# Patient Record
Sex: MALE | Race: WHITE | NOT HISPANIC OR LATINO | Employment: FULL TIME | URBAN - METROPOLITAN AREA
[De-identification: names, ages, dates, MRNs, and addresses within clinical notes are randomized per-mention and may not be internally consistent; named-entity substitution may affect disease eponyms.]

---

## 2018-01-29 ENCOUNTER — OFFICE VISIT - HEALTHEAST (OUTPATIENT)
Dept: FAMILY MEDICINE | Facility: CLINIC | Age: 38
End: 2018-01-29

## 2018-01-29 DIAGNOSIS — E66.811 OBESITY (BMI 30.0-34.9): ICD-10-CM

## 2018-01-29 DIAGNOSIS — E89.0 POSTOPERATIVE HYPOTHYROIDISM: ICD-10-CM

## 2018-01-29 DIAGNOSIS — E89.0 HISTORY OF TOTAL THYROIDECTOMY: ICD-10-CM

## 2018-01-29 DIAGNOSIS — Z13.220 SCREENING FOR LIPID DISORDERS: ICD-10-CM

## 2018-01-29 DIAGNOSIS — F17.200 SMOKER: ICD-10-CM

## 2018-01-29 DIAGNOSIS — I10 HYPERTENSION: ICD-10-CM

## 2018-01-29 LAB
ANION GAP SERPL CALCULATED.3IONS-SCNC: 12 MMOL/L (ref 5–18)
BUN SERPL-MCNC: 11 MG/DL (ref 8–22)
CALCIUM SERPL-MCNC: 10.2 MG/DL (ref 8.5–10.5)
CHLORIDE BLD-SCNC: 103 MMOL/L (ref 98–107)
CHOLEST SERPL-MCNC: 373 MG/DL
CO2 SERPL-SCNC: 23 MMOL/L (ref 22–31)
CREAT SERPL-MCNC: 0.89 MG/DL (ref 0.7–1.3)
FASTING STATUS PATIENT QL REPORTED: NO
GFR SERPL CREATININE-BSD FRML MDRD: >60 ML/MIN/1.73M2
GLUCOSE BLD-MCNC: 77 MG/DL (ref 70–125)
HDLC SERPL-MCNC: 56 MG/DL
LDLC SERPL CALC-MCNC: 252 MG/DL
LDLC SERPL CALC-MCNC: ABNORMAL MG/DL
POTASSIUM BLD-SCNC: 4.1 MMOL/L (ref 3.5–5)
SODIUM SERPL-SCNC: 138 MMOL/L (ref 136–145)
TRIGL SERPL-MCNC: 489 MG/DL
TSH SERPL DL<=0.005 MIU/L-ACNC: 98.82 UIU/ML (ref 0.3–5)

## 2018-01-29 ASSESSMENT — MIFFLIN-ST. JEOR: SCORE: 1671.48

## 2018-01-30 ENCOUNTER — COMMUNICATION - HEALTHEAST (OUTPATIENT)
Dept: FAMILY MEDICINE | Facility: CLINIC | Age: 38
End: 2018-01-30

## 2018-02-01 ENCOUNTER — COMMUNICATION - HEALTHEAST (OUTPATIENT)
Dept: FAMILY MEDICINE | Facility: CLINIC | Age: 38
End: 2018-02-01

## 2018-02-09 ENCOUNTER — OFFICE VISIT - HEALTHEAST (OUTPATIENT)
Dept: FAMILY MEDICINE | Facility: CLINIC | Age: 38
End: 2018-02-09

## 2018-02-09 DIAGNOSIS — E78.2 MIXED HYPERLIPIDEMIA: ICD-10-CM

## 2018-02-09 DIAGNOSIS — I10 ESSENTIAL HYPERTENSION: ICD-10-CM

## 2018-02-09 DIAGNOSIS — E89.0 POSTOPERATIVE HYPOTHYROIDISM: ICD-10-CM

## 2018-02-09 DIAGNOSIS — Z31.41 FERTILITY TESTING: ICD-10-CM

## 2018-02-09 DIAGNOSIS — Z76.89 ESTABLISHING CARE WITH NEW DOCTOR, ENCOUNTER FOR: ICD-10-CM

## 2018-02-09 ASSESSMENT — MIFFLIN-ST. JEOR: SCORE: 1663.66

## 2018-06-11 ENCOUNTER — COMMUNICATION - HEALTHEAST (OUTPATIENT)
Dept: FAMILY MEDICINE | Facility: CLINIC | Age: 38
End: 2018-06-11

## 2018-06-11 DIAGNOSIS — I10 HYPERTENSION: ICD-10-CM

## 2018-06-11 DIAGNOSIS — E89.0 POSTOPERATIVE HYPOTHYROIDISM: ICD-10-CM

## 2018-06-12 ENCOUNTER — COMMUNICATION - HEALTHEAST (OUTPATIENT)
Dept: FAMILY MEDICINE | Facility: CLINIC | Age: 38
End: 2018-06-12

## 2018-06-12 DIAGNOSIS — E89.0 POSTOPERATIVE HYPOTHYROIDISM: ICD-10-CM

## 2018-06-12 DIAGNOSIS — I10 HYPERTENSION: ICD-10-CM

## 2018-10-23 ENCOUNTER — COMMUNICATION - HEALTHEAST (OUTPATIENT)
Dept: FAMILY MEDICINE | Facility: CLINIC | Age: 38
End: 2018-10-23

## 2018-10-23 DIAGNOSIS — E89.0 POSTOPERATIVE HYPOTHYROIDISM: ICD-10-CM

## 2018-10-23 DIAGNOSIS — I10 HYPERTENSION: ICD-10-CM

## 2019-06-20 ENCOUNTER — COMMUNICATION - HEALTHEAST (OUTPATIENT)
Dept: FAMILY MEDICINE | Facility: CLINIC | Age: 39
End: 2019-06-20

## 2019-06-20 DIAGNOSIS — E89.0 POSTOPERATIVE HYPOTHYROIDISM: ICD-10-CM

## 2019-06-20 DIAGNOSIS — I10 HYPERTENSION: ICD-10-CM

## 2019-06-25 ENCOUNTER — OFFICE VISIT - HEALTHEAST (OUTPATIENT)
Dept: FAMILY MEDICINE | Facility: CLINIC | Age: 39
End: 2019-06-25

## 2019-06-25 DIAGNOSIS — E89.0 POSTOPERATIVE HYPOTHYROIDISM: ICD-10-CM

## 2019-06-25 DIAGNOSIS — Z79.899 MEDICATION MANAGEMENT: ICD-10-CM

## 2019-06-25 DIAGNOSIS — I10 ESSENTIAL HYPERTENSION: ICD-10-CM

## 2019-06-25 DIAGNOSIS — E66.09 CLASS 1 OBESITY DUE TO EXCESS CALORIES WITH SERIOUS COMORBIDITY AND BODY MASS INDEX (BMI) OF 30.0 TO 30.9 IN ADULT: ICD-10-CM

## 2019-06-25 DIAGNOSIS — Z71.6 ENCOUNTER FOR SMOKING CESSATION COUNSELING: ICD-10-CM

## 2019-06-25 DIAGNOSIS — E78.2 MIXED HYPERLIPIDEMIA: ICD-10-CM

## 2019-06-25 DIAGNOSIS — E66.811 CLASS 1 OBESITY DUE TO EXCESS CALORIES WITH SERIOUS COMORBIDITY AND BODY MASS INDEX (BMI) OF 30.0 TO 30.9 IN ADULT: ICD-10-CM

## 2019-06-25 LAB
ALBUMIN SERPL-MCNC: 3.5 G/DL (ref 3.5–5)
ALP SERPL-CCNC: 72 U/L (ref 45–120)
ALT SERPL W P-5'-P-CCNC: 46 U/L (ref 0–45)
ANION GAP SERPL CALCULATED.3IONS-SCNC: 11 MMOL/L (ref 5–18)
AST SERPL W P-5'-P-CCNC: 24 U/L (ref 0–40)
BILIRUB SERPL-MCNC: 0.7 MG/DL (ref 0–1)
BUN SERPL-MCNC: 14 MG/DL (ref 8–22)
CALCIUM SERPL-MCNC: 10.4 MG/DL (ref 8.5–10.5)
CHLORIDE BLD-SCNC: 101 MMOL/L (ref 98–107)
CHOLEST SERPL-MCNC: 151 MG/DL
CO2 SERPL-SCNC: 25 MMOL/L (ref 22–31)
CREAT SERPL-MCNC: 0.93 MG/DL (ref 0.7–1.3)
FASTING STATUS PATIENT QL REPORTED: YES
GFR SERPL CREATININE-BSD FRML MDRD: >60 ML/MIN/1.73M2
GLUCOSE BLD-MCNC: 95 MG/DL (ref 70–125)
HDLC SERPL-MCNC: 37 MG/DL
LDLC SERPL CALC-MCNC: 82 MG/DL
POTASSIUM BLD-SCNC: 3.9 MMOL/L (ref 3.5–5)
PROT SERPL-MCNC: 7.6 G/DL (ref 6–8)
SODIUM SERPL-SCNC: 137 MMOL/L (ref 136–145)
TRIGL SERPL-MCNC: 158 MG/DL
TSH SERPL DL<=0.005 MIU/L-ACNC: 1.22 UIU/ML (ref 0.3–5)

## 2019-06-25 ASSESSMENT — MIFFLIN-ST. JEOR: SCORE: 1668.99

## 2019-06-30 ENCOUNTER — COMMUNICATION - HEALTHEAST (OUTPATIENT)
Dept: FAMILY MEDICINE | Facility: CLINIC | Age: 39
End: 2019-06-30

## 2019-08-10 ENCOUNTER — COMMUNICATION - HEALTHEAST (OUTPATIENT)
Dept: FAMILY MEDICINE | Facility: CLINIC | Age: 39
End: 2019-08-10

## 2019-08-10 DIAGNOSIS — E89.0 POSTOPERATIVE HYPOTHYROIDISM: ICD-10-CM

## 2019-08-10 DIAGNOSIS — I10 HYPERTENSION: ICD-10-CM

## 2019-08-13 ENCOUNTER — COMMUNICATION - HEALTHEAST (OUTPATIENT)
Dept: FAMILY MEDICINE | Facility: CLINIC | Age: 39
End: 2019-08-13

## 2019-08-13 DIAGNOSIS — I10 ESSENTIAL HYPERTENSION: ICD-10-CM

## 2019-08-30 ENCOUNTER — OFFICE VISIT - HEALTHEAST (OUTPATIENT)
Dept: FAMILY MEDICINE | Facility: CLINIC | Age: 39
End: 2019-08-30

## 2019-08-30 DIAGNOSIS — L30.8 OTHER ECZEMA: ICD-10-CM

## 2019-08-30 ASSESSMENT — MIFFLIN-ST. JEOR: SCORE: 1703.92

## 2019-09-08 ENCOUNTER — OFFICE VISIT - HEALTHEAST (OUTPATIENT)
Dept: FAMILY MEDICINE | Facility: CLINIC | Age: 39
End: 2019-09-08

## 2019-09-08 DIAGNOSIS — R21 RASH AND NONSPECIFIC SKIN ERUPTION: ICD-10-CM

## 2019-09-30 ENCOUNTER — COMMUNICATION - HEALTHEAST (OUTPATIENT)
Dept: FAMILY MEDICINE | Facility: CLINIC | Age: 39
End: 2019-09-30

## 2019-09-30 DIAGNOSIS — E89.0 POSTOPERATIVE HYPOTHYROIDISM: ICD-10-CM

## 2020-04-10 ENCOUNTER — COMMUNICATION - HEALTHEAST (OUTPATIENT)
Dept: FAMILY MEDICINE | Facility: CLINIC | Age: 40
End: 2020-04-10

## 2020-04-10 DIAGNOSIS — I10 ESSENTIAL HYPERTENSION: ICD-10-CM

## 2020-07-29 ENCOUNTER — COMMUNICATION - HEALTHEAST (OUTPATIENT)
Dept: FAMILY MEDICINE | Facility: CLINIC | Age: 40
End: 2020-07-29

## 2020-07-29 DIAGNOSIS — I10 ESSENTIAL HYPERTENSION: ICD-10-CM

## 2020-08-18 ENCOUNTER — COMMUNICATION - HEALTHEAST (OUTPATIENT)
Dept: FAMILY MEDICINE | Facility: CLINIC | Age: 40
End: 2020-08-18

## 2020-08-18 DIAGNOSIS — E89.0 POSTOPERATIVE HYPOTHYROIDISM: ICD-10-CM

## 2020-08-20 ENCOUNTER — COMMUNICATION - HEALTHEAST (OUTPATIENT)
Dept: FAMILY MEDICINE | Facility: CLINIC | Age: 40
End: 2020-08-20

## 2020-08-24 ENCOUNTER — OFFICE VISIT - HEALTHEAST (OUTPATIENT)
Dept: FAMILY MEDICINE | Facility: CLINIC | Age: 40
End: 2020-08-24

## 2020-08-24 DIAGNOSIS — Z11.4 SCREENING FOR HIV (HUMAN IMMUNODEFICIENCY VIRUS): ICD-10-CM

## 2020-08-24 DIAGNOSIS — E78.2 MIXED HYPERLIPIDEMIA: ICD-10-CM

## 2020-08-24 DIAGNOSIS — E89.0 POSTOPERATIVE HYPOTHYROIDISM: ICD-10-CM

## 2020-08-24 DIAGNOSIS — I10 ESSENTIAL HYPERTENSION: ICD-10-CM

## 2020-08-24 DIAGNOSIS — Z72.0 TOBACCO ABUSE: ICD-10-CM

## 2020-08-24 LAB
ALBUMIN SERPL-MCNC: 4 G/DL (ref 3.5–5)
ALP SERPL-CCNC: 68 U/L (ref 45–120)
ALT SERPL W P-5'-P-CCNC: 42 U/L (ref 0–45)
ANION GAP SERPL CALCULATED.3IONS-SCNC: 8 MMOL/L (ref 5–18)
AST SERPL W P-5'-P-CCNC: 20 U/L (ref 0–40)
BILIRUB SERPL-MCNC: 0.3 MG/DL (ref 0–1)
BUN SERPL-MCNC: 11 MG/DL (ref 8–22)
CALCIUM SERPL-MCNC: 9.6 MG/DL (ref 8.5–10.5)
CHLORIDE BLD-SCNC: 105 MMOL/L (ref 98–107)
CHOLEST SERPL-MCNC: 168 MG/DL
CO2 SERPL-SCNC: 25 MMOL/L (ref 22–31)
CREAT SERPL-MCNC: 0.82 MG/DL (ref 0.7–1.3)
FASTING STATUS PATIENT QL REPORTED: YES
GFR SERPL CREATININE-BSD FRML MDRD: >60 ML/MIN/1.73M2
GLUCOSE BLD-MCNC: 100 MG/DL (ref 70–125)
HDLC SERPL-MCNC: 40 MG/DL
HIV 1+2 AB+HIV1 P24 AG SERPL QL IA: NEGATIVE
LDLC SERPL CALC-MCNC: 97 MG/DL
POTASSIUM BLD-SCNC: 3.9 MMOL/L (ref 3.5–5)
PROT SERPL-MCNC: 7 G/DL (ref 6–8)
SODIUM SERPL-SCNC: 138 MMOL/L (ref 136–145)
TRIGL SERPL-MCNC: 154 MG/DL
TSH SERPL DL<=0.005 MIU/L-ACNC: 4.99 UIU/ML (ref 0.3–5)

## 2020-08-24 RX ORDER — BUPROPION HYDROCHLORIDE 150 MG/1
150 TABLET ORAL DAILY
Qty: 30 TABLET | Refills: 3 | Status: SHIPPED | OUTPATIENT
Start: 2020-08-24 | End: 2021-12-03

## 2020-08-24 ASSESSMENT — MIFFLIN-ST. JEOR: SCORE: 1712.99

## 2020-10-03 ENCOUNTER — COMMUNICATION - HEALTHEAST (OUTPATIENT)
Dept: FAMILY MEDICINE | Facility: CLINIC | Age: 40
End: 2020-10-03

## 2020-10-03 DIAGNOSIS — E78.2 MIXED HYPERLIPIDEMIA: ICD-10-CM

## 2020-10-06 RX ORDER — ATORVASTATIN CALCIUM 40 MG/1
TABLET, FILM COATED ORAL
Qty: 90 TABLET | Refills: 3 | Status: SHIPPED | OUTPATIENT
Start: 2020-10-06 | End: 2021-12-02

## 2020-11-22 ENCOUNTER — COMMUNICATION - HEALTHEAST (OUTPATIENT)
Dept: FAMILY MEDICINE | Facility: CLINIC | Age: 40
End: 2020-11-22

## 2020-11-22 DIAGNOSIS — I10 ESSENTIAL HYPERTENSION: ICD-10-CM

## 2020-11-23 RX ORDER — LOSARTAN POTASSIUM 50 MG/1
TABLET ORAL
Qty: 90 TABLET | Refills: 2 | Status: SHIPPED | OUTPATIENT
Start: 2020-11-23 | End: 2021-12-03

## 2021-01-25 ENCOUNTER — COMMUNICATION - HEALTHEAST (OUTPATIENT)
Dept: FAMILY MEDICINE | Facility: CLINIC | Age: 41
End: 2021-01-25

## 2021-01-25 DIAGNOSIS — E89.0 POSTOPERATIVE HYPOTHYROIDISM: ICD-10-CM

## 2021-01-25 RX ORDER — LEVOTHYROXINE SODIUM 200 UG/1
TABLET ORAL
Qty: 90 TABLET | Refills: 1 | Status: SHIPPED | OUTPATIENT
Start: 2021-01-25 | End: 2021-10-27

## 2021-05-26 ENCOUNTER — RECORDS - HEALTHEAST (OUTPATIENT)
Dept: ADMINISTRATIVE | Facility: CLINIC | Age: 41
End: 2021-05-26

## 2021-05-29 NOTE — PROGRESS NOTES
Assessment and Plan:     1. Mixed hyperlipidemia  He continues atorvastatin 40 mg daily.  Will adjust according to lipid cascade results.  - atorvastatin (LIPITOR) 40 MG tablet; Take 1 tablet (40 mg total) by mouth daily.  Dispense: 90 tablet; Refill: 3  - Lipid Cascade    2. Postoperative hypothyroidism  He states he has been compliant with medication.  Will check thyroid cascade and adjust levothyroxine accordingly.  - Thyroid Cascade    3. Essential hypertension  Blood pressure is well controlled with losartan-hydrochlorothiazide.    4. Medication management  - Comprehensive Metabolic Panel    5. Encounter for smoking cessation counseling  I spent 5 minutes with patient discussing smoking cessation options.  Provided prescription for nicotine patches, use as directed.  Educated on its indications and side effects.  Patient is ready to quit smoking.  I encouraged follow-up with Dr. Quiñonez in 1 month for medication management or sooner with any further concerns.  He is content with the plan.  - nicotine (NICODERM CQ) 21 mg/24 hr; Place 1 patch on the skin daily.  Dispense: 42 patch; Refill: 0  - nicotine (NICODERM CQ) 14 mg/24 hr; Place 1 patch on the skin daily.  Dispense: 14 patch; Refill: 0  - nicotine (NICODERM CQ) 7 mg/24 hr; Place 1 patch on the skin daily.  Dispense: 14 patch; Refill: 0    6. Class 1 obesity due to excess calories with serious comorbidity and body mass index (BMI) of 30.0 to 30.9 in adult  The following high BMI interventions were performed this visit: encouragement to exercise and lifestyle education regarding diet      Subjective:     Choco is a 39 y.o. male presenting to the clinic for medication management.  Patient was diagnosed with Graves' disease when he was in the  in 2012.  He opted to treat with a total thyroidectomy.  He is taking levothyroxine 200 mcg daily.  Last TSH was 98.82 on 1/29/2018.  Patient states he is compliant with his medication.  He has hypertension  which is controlled with losartan-hydrochlorothiazide.  He denies chest pain, shortness of breath with exertion, edema, orthopnea, syncope.  He is taking a atorvastatin 40 mg daily for hyperlipidemia.  Last cholesterol check was on 1/29/2018 with a total cholesterol of 373, triglycerides 489, HDL 56, .  Patient states he is ready to quit smoking.  He has tried the patches in the past, but admits he did not wean himself off of the patches.  He would like to restart these today.  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    Review of Systems: A complete 14 point review of systems was obtained and is negative or as stated in the history of present illness.    Social History     Socioeconomic History     Marital status:      Spouse name: Not on file     Number of children: Not on file     Years of education: Not on file     Highest education level: Not on file   Occupational History     Not on file   Social Needs     Financial resource strain: Not on file     Food insecurity:     Worry: Not on file     Inability: Not on file     Transportation needs:     Medical: Not on file     Non-medical: Not on file   Tobacco Use     Smoking status: Current Every Day Smoker     Packs/day: 1.00     Smokeless tobacco: Never Used   Substance and Sexual Activity     Alcohol use: Not on file     Comment: 3 beers per month     Drug use: No     Sexual activity: Yes     Partners: Female   Lifestyle     Physical activity:     Days per week: Not on file     Minutes per session: Not on file     Stress: Not on file   Relationships     Social connections:     Talks on phone: Not on file     Gets together: Not on file     Attends Faith service: Not on file     Active member of club or organization: Not on file     Attends meetings of clubs or organizations: Not on file     Relationship status: Not on file     Intimate partner violence:     Fear of current or ex partner: Not on file     Emotionally abused: Not on file     Physically  "abused: Not on file     Forced sexual activity: Not on file   Other Topics Concern     Not on file   Social History Narrative     Not on file       Active Ambulatory Problems     Diagnosis Date Noted     Postoperative hypothyroidism 02/09/2018     Essential hypertension 02/09/2018     Mixed hyperlipidemia 02/09/2018     Resolved Ambulatory Problems     Diagnosis Date Noted     No Resolved Ambulatory Problems     Past Medical History:   Diagnosis Date     Hypertension        Family History   Problem Relation Age of Onset     Hypertension Mother        Objective:     /68   Pulse 77   Ht 5' 4.5\" (1.638 m)   Wt 186 lb 4.8 oz (84.5 kg)   SpO2 97%   BMI 31.48 kg/m      Patient is alert, in no obvious distress.   Skin: Warm, dry.  No lesions or rashes.  Skin turgor rapid return.   HEENT:  Head normocephalic, atraumatic.  Eyes normal. Ears normal.  Nose patent, mucosa pink.  Oropharynx mucosa pink.  No lesions or tonsillar enlargement.   Neck: Supple, no lymphadenopathy.   Lungs:  Clear to auscultation. Respirations even and unlabored.  No wheezing or rales noted.   Heart:  Regular rate and rhythm.  No murmurs, S3, S4, gallops, or rubs.    Abdomen: Soft, nontender.  No organomegaly. Bowel sounds normoactive. No guarding or masses noted.   Musculoskeletal:  No edema present in bilateral lower extremities.               "

## 2021-05-29 NOTE — TELEPHONE ENCOUNTER
RN cannot approve Refill Request    RN can NOT refill this medication overdue for office visits and/or labs.    Choco Reyes, [facility] Triage/Med Refill 6/21/2019    Requested Prescriptions   Pending Prescriptions Disp Refills     levothyroxine (SYNTHROID, LEVOTHROID) 200 MCG tablet 90 tablet 1     Sig: Take 1 tablet (200 mcg total) by mouth daily.       Thyroid Hormones Protocol Failed - 6/20/2019  4:56 PM        Failed - Provider visit in past 12 months or next 3 months     Last office visit with prescriber/PCP: 2/9/2018 Ce Quiñonez MD OR same dept: Visit date not found OR same specialty: 2/9/2018 Ce Quiñonez MD  Last physical: Visit date not found Last MTM visit: Visit date not found   Next visit within 3 mo: Visit date not found  Next physical within 3 mo: Visit date not found  Prescriber OR PCP: Ce Quiñonez MD  Last diagnosis associated with med order: 1. Postoperative hypothyroidism  - levothyroxine (SYNTHROID, LEVOTHROID) 200 MCG tablet; Take 1 tablet (200 mcg total) by mouth daily.  Dispense: 90 tablet; Refill: 1    2. Hypertension  - losartan-hydrochlorothiazide (HYZAAR) 50-12.5 mg per tablet; Take 1 tablet by mouth daily.  Dispense: 90 tablet; Refill: 1    If protocol passes may refill for 12 months if within 3 months of last provider visit (or a total of 15 months).             Failed - TSH on file in past 12 months for patient age 12 & older     TSH   Date Value Ref Range Status   01/29/2018 98.82 (H) 0.30 - 5.00 uIU/mL Final                   losartan-hydrochlorothiazide (HYZAAR) 50-12.5 mg per tablet 90 tablet 1     Sig: Take 1 tablet by mouth daily.       Diuretics/Combination Diuretics Refill Protocol  Failed - 6/20/2019  4:56 PM        Failed - Visit with PCP or prescribing provider visit in past 12 months     Last office visit with prescriber/PCP: 2/9/2018 Ce Quiñonez MD OR same dept: Visit date not found OR same specialty: 2/9/2018 Khang  Ce POLK MD  Last physical: Visit date not found Last MTM visit: Visit date not found   Next visit within 3 mo: Visit date not found  Next physical within 3 mo: Visit date not found  Prescriber OR PCP: Ce Quiñonez MD  Last diagnosis associated with med order: 1. Postoperative hypothyroidism  - levothyroxine (SYNTHROID, LEVOTHROID) 200 MCG tablet; Take 1 tablet (200 mcg total) by mouth daily.  Dispense: 90 tablet; Refill: 1    2. Hypertension  - losartan-hydrochlorothiazide (HYZAAR) 50-12.5 mg per tablet; Take 1 tablet by mouth daily.  Dispense: 90 tablet; Refill: 1    If protocol passes may refill for 12 months if within 3 months of last provider visit (or a total of 15 months).             Failed - Serum Potassium in past 12 months      No results found for: LN-POTASSIUM          Failed - Serum Sodium in past 12 months      No results found for: LN-SODIUM          Failed - Blood pressure on file in past 12 months     BP Readings from Last 1 Encounters:   02/09/18 128/86             Failed - Serum Creatinine in past 12 months      Creatinine   Date Value Ref Range Status   01/29/2018 0.89 0.70 - 1.30 mg/dL Final

## 2021-05-29 NOTE — TELEPHONE ENCOUNTER
FYI - Status Update  Who is Calling: Patient  Update: Patient stated he is scheduled for a physical next week.  Patient reported he will run out of his medication before his appointment.  Patient is requesting a fill to help him get to his appointment next week?  Okay to leave a detailed message?:  Yes  [phone]

## 2021-05-31 VITALS — HEIGHT: 64 IN | WEIGHT: 188.6 LBS | BODY MASS INDEX: 32.2 KG/M2

## 2021-05-31 VITALS — BODY MASS INDEX: 31.76 KG/M2 | WEIGHT: 186 LBS | HEIGHT: 64 IN

## 2021-05-31 NOTE — TELEPHONE ENCOUNTER
Medication Question or Clarification  Who is calling: Pharmacy: [facility]  What medication are you calling about? (include dose and sig)   losartan-hydrochlorothiazide (HYZAAR) 50-12.5 mg per tablet 90 tablet 3 8/11/2019     Sig: TAKE 1 TABLET BY MOUTH ONCE DAILY        Who prescribed the medication?: Dr Quiñonez  What is your question/concern?: Fax request received from pharmacy states : drug on back order. Please send 2 separate Rx for 2 separate drugs. Thanks.  Pharmacy: [facility]  Okay to leave a detailed message?: Yes  Site CMT - Please call the pharmacy to obtain any additional needed information.

## 2021-05-31 NOTE — TELEPHONE ENCOUNTER
Refill Approved     Rx renewed per Medication Renewal Policy. Medication was last renewed on 6/21/19.    Chikis Moran, [facility] Triage/Med Refill 8/11/2019     Requested Prescriptions   Pending Prescriptions Disp Refills     losartan-hydrochlorothiazide (HYZAAR) 50-12.5 mg per tablet [Pharmacy Med Name: LOSARTAN/HCT 50-12.5MG TAB] 90 tablet 1     Sig: TAKE 1 TABLET BY MOUTH ONCE DAILY       Diuretics/Combination Diuretics Refill Protocol  Passed - 8/10/2019  1:29 PM        Passed - Visit with PCP or prescribing provider visit in past 12 months     Last office visit with prescriber/PCP: 2/9/2018 Ce Quiñonez MD OR same dept: Visit date not found OR same specialty: 2/9/2018 Ce Quiñonez MD  Last physical: Visit date not found Last MTM visit: Visit date not found   Next visit within 3 mo: Visit date not found  Next physical within 3 mo: Visit date not found  Prescriber OR PCP: Ce Quiñonez MD  Last diagnosis associated with med order: 1. Hypertension  - losartan-hydrochlorothiazide (HYZAAR) 50-12.5 mg per tablet [Pharmacy Med Name: LOSARTAN/HCT 50-12.5MG TAB]; TAKE 1 TABLET BY MOUTH ONCE DAILY  Dispense: 90 tablet; Refill: 1    2. Postoperative hypothyroidism  - EUTHYROX 200 mcg tablet [Pharmacy Med Name: EUTHYROX 200MCG  TAB]; TAKE 1 TABLET BY MOUTH ONCE DAILY  Dispense: 30 tablet; Refill: 0    If protocol passes may refill for 12 months if within 3 months of last provider visit (or a total of 15 months).             Passed - Serum Potassium in past 12 months      Lab Results   Component Value Date    Potassium 3.9 06/25/2019             Passed - Serum Sodium in past 12 months      Lab Results   Component Value Date    Sodium 137 06/25/2019             Passed - Blood pressure on file in past 12 months     BP Readings from Last 1 Encounters:   06/25/19 100/68             Passed - Serum Creatinine in past 12 months      Creatinine   Date Value Ref Range Status   06/25/2019 0.93 0.70 -  1.30 mg/dL Final             EUTHYROX 200 mcg tablet [Pharmacy Med Name: EUTHYROX 200MCG  TAB] 30 tablet 0     Sig: TAKE 1 TABLET BY MOUTH ONCE DAILY       Thyroid Hormones Protocol Passed - 8/10/2019  1:29 PM        Passed - Provider visit in past 12 months or next 3 months     Last office visit with prescriber/PCP: 2/9/2018 Ce Quiñonez MD OR same dept: Visit date not found OR same specialty: 2/9/2018 Ce Quiñonez MD  Last physical: Visit date not found Last MTM visit: Visit date not found   Next visit within 3 mo: Visit date not found  Next physical within 3 mo: Visit date not found  Prescriber OR PCP: Ce Quiñonez MD  Last diagnosis associated with med order: 1. Hypertension  - losartan-hydrochlorothiazide (HYZAAR) 50-12.5 mg per tablet [Pharmacy Med Name: LOSARTAN/HCT 50-12.5MG TAB]; TAKE 1 TABLET BY MOUTH ONCE DAILY  Dispense: 90 tablet; Refill: 1    2. Postoperative hypothyroidism  - EUTHYROX 200 mcg tablet [Pharmacy Med Name: EUTHYROX 200MCG  TAB]; TAKE 1 TABLET BY MOUTH ONCE DAILY  Dispense: 30 tablet; Refill: 0    If protocol passes may refill for 12 months if within 3 months of last provider visit (or a total of 15 months).             Passed - TSH on file in past 12 months for patient age 12 & older     TSH   Date Value Ref Range Status   06/25/2019 1.22 0.30 - 5.00 uIU/mL Final

## 2021-05-31 NOTE — TELEPHONE ENCOUNTER
RN cannot approve Refill Request EUTHYROX    RN can NOT refill this medication medication not on med list. Last office visit: 2/9/2018 Ce Quiñonez MD Last Physical: Visit date not found Last MTM visit: Visit date not found Last visit same specialty: 2/9/2018 Ce Quiñonez MD.  Next visit within 3 mo: Visit date not found  Next physical within 3 mo: Visit date not found      Chikis Moran, [facility] Triage/Med Refill 8/11/2019    Requested Prescriptions   Pending Prescriptions Disp Refills     EUTHYROX 200 mcg tablet [Pharmacy Med Name: EUTHYROX 200MCG  TAB] 30 tablet 0     Sig: TAKE 1 TABLET BY MOUTH ONCE DAILY       Thyroid Hormones Protocol Passed - 8/10/2019  1:29 PM        Passed - Provider visit in past 12 months or next 3 months     Last office visit with prescriber/PCP: 2/9/2018 Ce Quiñonez MD OR same dept: Visit date not found OR same specialty: 2/9/2018 Ce Quiñonez MD  Last physical: Visit date not found Last MTM visit: Visit date not found   Next visit within 3 mo: Visit date not found  Next physical within 3 mo: Visit date not found  Prescriber OR PCP: Ce Quiñonez MD  Last diagnosis associated with med order: 1. Hypertension  - losartan-hydrochlorothiazide (HYZAAR) 50-12.5 mg per tablet; TAKE 1 TABLET BY MOUTH ONCE DAILY  Dispense: 90 tablet; Refill: 3    2. Postoperative hypothyroidism  - EUTHYROX 200 mcg tablet [Pharmacy Med Name: EUTHYROX 200MCG  TAB]; TAKE 1 TABLET BY MOUTH ONCE DAILY  Dispense: 30 tablet; Refill: 0    If protocol passes may refill for 12 months if within 3 months of last provider visit (or a total of 15 months).             Passed - TSH on file in past 12 months for patient age 12 & older     TSH   Date Value Ref Range Status   06/25/2019 1.22 0.30 - 5.00 uIU/mL Final                 Signed Prescriptions Disp Refills    losartan-hydrochlorothiazide (HYZAAR) 50-12.5 mg per tablet 90 tablet 3     Sig: TAKE 1 TABLET BY MOUTH ONCE  DAILY       Diuretics/Combination Diuretics Refill Protocol  Passed - 8/10/2019  1:29 PM        Passed - Visit with PCP or prescribing provider visit in past 12 months     Last office visit with prescriber/PCP: 2/9/2018 Ce Quiñonez MD OR same dept: Visit date not found OR same specialty: 2/9/2018 Ce Quiñonez MD  Last physical: Visit date not found Last MTM visit: Visit date not found   Next visit within 3 mo: Visit date not found  Next physical within 3 mo: Visit date not found  Prescriber OR PCP: Ce Quiñonez MD  Last diagnosis associated with med order: 1. Hypertension  - losartan-hydrochlorothiazide (HYZAAR) 50-12.5 mg per tablet; TAKE 1 TABLET BY MOUTH ONCE DAILY  Dispense: 90 tablet; Refill: 3    2. Postoperative hypothyroidism  - EUTHYROX 200 mcg tablet [Pharmacy Med Name: EUTHYROX 200MCG  TAB]; TAKE 1 TABLET BY MOUTH ONCE DAILY  Dispense: 30 tablet; Refill: 0    If protocol passes may refill for 12 months if within 3 months of last provider visit (or a total of 15 months).             Passed - Serum Potassium in past 12 months      Lab Results   Component Value Date    Potassium 3.9 06/25/2019             Passed - Serum Sodium in past 12 months      Lab Results   Component Value Date    Sodium 137 06/25/2019             Passed - Blood pressure on file in past 12 months     BP Readings from Last 1 Encounters:   06/25/19 100/68             Passed - Serum Creatinine in past 12 months      Creatinine   Date Value Ref Range Status   06/25/2019 0.93 0.70 - 1.30 mg/dL Final

## 2021-06-01 NOTE — PROGRESS NOTES
"Subjective:      Patient ID: Choco Vu is a 39 y.o. male.    Chief Complaint:    HPI  Choco Vu is a 39 y.o. male who returns to clinic for reevaluation of his problems.  Patient was seen by myself in clinic last week.  Patient states he now has swelling and redness and itching around the eyes after using the Eucerin cream.  Patient states that he has had about the same results with the scrotum and penis.  He thinks that the skin is \"less flaky and dry\" but it is not resolved.  He has not used the OTC hydrocortisone on the face.  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  This has went down and is not having edema or swelling on the eyelids currently.  It is just red and itching.      Past Medical History:   Diagnosis Date     Hypertension        Past Surgical History:   Procedure Laterality Date     APPENDECTOMY       TOTAL THYROIDECTOMY         Family History   Problem Relation Age of Onset     Hypertension Mother      No Medical Problems Father        Social History     Tobacco Use     Smoking status: Current Every Day Smoker     Packs/day: 1.00     Smokeless tobacco: Never Used   Substance Use Topics     Alcohol use: Not on file     Comment: 3 beers per month     Drug use: No       Review of Systems  As above in HPI, otherwise balance of Review of Systems are negative.    Objective:     /76   Pulse 79   Temp 97.9  F (36.6  C) (Oral)   Wt 195 lb (88.5 kg)   SpO2 96%   BMI 32.95 kg/m      Physical Exam  General: Patient is resting comfortably no acute distress is afebrile  HEENT: Head is normocephalic atraumatic   eyes are PERRL EOMI sclera anicteric  There is erythema on the upper and lower eyelids and around the periorbital area but no periorbital edema.  TMs are clear bilaterally  Throat is clear oral pharyngeal airway is patent.    No cervical lymphadenopathy present  Skin: With rash as described in the " HPI.   was deferred and no examination of the scrotum and penis was done in the office today.    Assessment:     Procedures    The encounter diagnosis was Rash and nonspecific skin eruption.    Plan:     1. Rash and nonspecific skin eruption  Ambulatory referral to Dermatology    predniSONE (DELTASONE) 20 MG tablet    cephalexin (KEFLEX) 500 MG capsule       Advised the patient that he still has the involvement of the rash in the popliteal fossa on the lower extremities the genital area and on the face.  I do think this is a systemic problem and that the rashes are related to her form of eczema.  Patient will be given systemic steroids, antibiotic to help with any cracking superimposed infection in the genital area as described by the patient and follow-up with dermatology for definitive evaluation and treatment.  It was hoping for more resolution with the rash and the treatment that he received last week.  If dermatology can evaluate and treat the rash as I do think this is a systemic issue and not localized to 2 separate entities.    Patient Instructions   He may use over-the-counter hydrocortisone topically on the face.  Take the oral prednisone as written for 5 days this will help with the rash all over the body.  Take antibiotic for possible superimposed infection on the scrotum.  Referral to dermatology for specific evaluation and treatment.  You may follow-up with your primary care provider in the interim if new symptoms or concerns arise before seeing dermatology.

## 2021-06-01 NOTE — PATIENT INSTRUCTIONS - HE
He may use over-the-counter hydrocortisone topically on the face.  Take the oral prednisone as written for 5 days this will help with the rash all over the body.  Take antibiotic for possible superimposed infection on the scrotum.  Referral to dermatology for specific evaluation and treatment.  You may follow-up with your primary care provider in the interim if new symptoms or concerns arise before seeing dermatology.

## 2021-06-01 NOTE — TELEPHONE ENCOUNTER
Refill Approved    Rx renewed per Medication Renewal Policy. Medication was last renewed on 8/12/19.    Dee Medeiros, [facility] Triage/Med Refill 9/30/2019     Requested Prescriptions   Pending Prescriptions Disp Refills     levothyroxine (EUTHYROX) 200 MCG tablet 30 tablet 0     Sig: Take 1 tablet (200 mcg total) by mouth daily.       Thyroid Hormones Protocol Passed - 9/30/2019  1:17 PM        Passed - Provider visit in past 12 months or next 3 months     Last office visit with prescriber/PCP: 2/9/2018 Ce Quiñonez MD OR same dept: Visit date not found OR same specialty: 2/9/2018 Ce Quiñonez MD  Last physical: Visit date not found Last MTM visit: Visit date not found   Next visit within 3 mo: Visit date not found  Next physical within 3 mo: Visit date not found  Prescriber OR PCP: Ce Quiñonez MD  Last diagnosis associated with med order: 1. Postoperative hypothyroidism  - levothyroxine (EUTHYROX) 200 MCG tablet; Take 1 tablet (200 mcg total) by mouth daily.  Dispense: 30 tablet; Refill: 0    If protocol passes may refill for 12 months if within 3 months of last provider visit (or a total of 15 months).             Passed - TSH on file in past 12 months for patient age 12 & older     TSH   Date Value Ref Range Status   06/25/2019 1.22 0.30 - 5.00 uIU/mL Final

## 2021-06-03 VITALS — HEIGHT: 65 IN | WEIGHT: 194 LBS | BODY MASS INDEX: 32.32 KG/M2

## 2021-06-03 VITALS
DIASTOLIC BLOOD PRESSURE: 76 MMHG | WEIGHT: 195 LBS | HEART RATE: 79 BPM | SYSTOLIC BLOOD PRESSURE: 128 MMHG | TEMPERATURE: 97.9 F | BODY MASS INDEX: 32.95 KG/M2 | OXYGEN SATURATION: 96 %

## 2021-06-03 VITALS — BODY MASS INDEX: 31.04 KG/M2 | WEIGHT: 186.3 LBS | HEIGHT: 65 IN

## 2021-06-04 VITALS
SYSTOLIC BLOOD PRESSURE: 104 MMHG | HEART RATE: 80 BPM | BODY MASS INDEX: 32.65 KG/M2 | DIASTOLIC BLOOD PRESSURE: 82 MMHG | HEIGHT: 65 IN | OXYGEN SATURATION: 98 % | WEIGHT: 196 LBS

## 2021-06-07 NOTE — TELEPHONE ENCOUNTER
Refill Approved    Rx renewed per Medication Renewal Policy. Medication was last renewed on 8/13/19, last OV 6/25/19.    Anna Clark, [facility] Triage/Med Refill 4/12/2020     Requested Prescriptions   Pending Prescriptions Disp Refills     losartan (COZAAR) 50 MG tablet [Pharmacy Med Name: Losartan Potassium 50 MG Oral Tablet] 90 tablet 0     Sig: Take 1 tablet by mouth once daily       Angiotensin Receptor Blocker Protocol Passed - 4/10/2020  6:43 PM        Passed - PCP or prescribing provider visit in past 12 months       Last office visit with prescriber/PCP: 2/9/2018 Ce Quiñonez MD OR same dept: Visit date not found OR same specialty: 2/9/2018 Ce Quiñonez MD  Last physical: Visit date not found Last MTM visit: Visit date not found   Next visit within 3 mo: Visit date not found  Next physical within 3 mo: Visit date not found  Prescriber OR PCP: Ce Quiñonez MD  Last diagnosis associated with med order: 1. Essential hypertension  - losartan (COZAAR) 50 MG tablet [Pharmacy Med Name: Losartan Potassium 50 MG Oral Tablet]; Take 1 tablet by mouth once daily  Dispense: 90 tablet; Refill: 0  - hydroCHLOROthiazide (HYDRODIURIL) 12.5 MG tablet [Pharmacy Med Name: hydroCHLOROthiazide 12.5 MG Oral Tablet]; Take 1 tablet by mouth once daily  Dispense: 90 tablet; Refill: 0    If protocol passes may refill for 12 months if within 3 months of last provider visit (or a total of 15 months).             Passed - Serum potassium within the past 12 months     Lab Results   Component Value Date    Potassium 3.9 06/25/2019             Passed - Blood pressure filed in past 12 months     BP Readings from Last 1 Encounters:   09/08/19 128/76             Passed - Serum creatinine within the past 12 months     Creatinine   Date Value Ref Range Status   06/25/2019 0.93 0.70 - 1.30 mg/dL Final                hydroCHLOROthiazide (HYDRODIURIL) 12.5 MG tablet [Pharmacy Med Name: hydroCHLOROthiazide 12.5  MG Oral Tablet] 90 tablet 0     Sig: Take 1 tablet by mouth once daily       Diuretics/Combination Diuretics Refill Protocol  Passed - 4/10/2020  6:43 PM        Passed - Visit with PCP or prescribing provider visit in past 12 months     Last office visit with prescriber/PCP: 2/9/2018 Ce Quiñonez MD OR same dept: Visit date not found OR same specialty: 2/9/2018 Ce Quiñonez MD  Last physical: Visit date not found Last MTM visit: Visit date not found   Next visit within 3 mo: Visit date not found  Next physical within 3 mo: Visit date not found  Prescriber OR PCP: Ce Quiñonez MD  Last diagnosis associated with med order: 1. Essential hypertension  - losartan (COZAAR) 50 MG tablet [Pharmacy Med Name: Losartan Potassium 50 MG Oral Tablet]; Take 1 tablet by mouth once daily  Dispense: 90 tablet; Refill: 0  - hydroCHLOROthiazide (HYDRODIURIL) 12.5 MG tablet [Pharmacy Med Name: hydroCHLOROthiazide 12.5 MG Oral Tablet]; Take 1 tablet by mouth once daily  Dispense: 90 tablet; Refill: 0    If protocol passes may refill for 12 months if within 3 months of last provider visit (or a total of 15 months).             Passed - Serum Potassium in past 12 months      Lab Results   Component Value Date    Potassium 3.9 06/25/2019             Passed - Serum Sodium in past 12 months      Lab Results   Component Value Date    Sodium 137 06/25/2019             Passed - Blood pressure on file in past 12 months     BP Readings from Last 1 Encounters:   09/08/19 128/76             Passed - Serum Creatinine in past 12 months      Creatinine   Date Value Ref Range Status   06/25/2019 0.93 0.70 - 1.30 mg/dL Final

## 2021-06-10 NOTE — TELEPHONE ENCOUNTER
RN cannot approve Refill Request    RN can NOT refill this medication Protocol failed and NO refill given. Last office visit: 2/9/2018 Ce Quiñonez MD Last Physical: Visit date not found Last MTM visit: Visit date not found Last visit same specialty: 2/9/2018 Ce Quiñonez MD.  Next visit within 3 mo: Visit date not found  Next physical within 3 mo: Visit date not found      Dee Medeiros, [facility] Triage/Med Refill 7/30/2020    Requested Prescriptions   Pending Prescriptions Disp Refills     hydroCHLOROthiazide (HYDRODIURIL) 12.5 MG tablet [Pharmacy Med Name: hydroCHLOROthiazide 12.5 MG Oral Tablet] 90 tablet 0     Sig: Take 1 tablet by mouth once daily       Diuretics/Combination Diuretics Refill Protocol  Failed - 7/29/2020  6:32 PM        Failed - Serum Potassium in past 12 months      No results found for: LN-POTASSIUM          Failed - Serum Sodium in past 12 months      No results found for: LN-SODIUM          Failed - Serum Creatinine in past 12 months      Creatinine   Date Value Ref Range Status   06/25/2019 0.93 0.70 - 1.30 mg/dL Final             Passed - Visit with PCP or prescribing provider visit in past 12 months     Last office visit with prescriber/PCP: 2/9/2018 Ce Quiñonez MD OR same dept: Visit date not found OR same specialty: 2/9/2018 Ce Quiñonez MD  Last physical: Visit date not found Last MTM visit: Visit date not found   Next visit within 3 mo: Visit date not found  Next physical within 3 mo: Visit date not found  Prescriber OR PCP: Ce Quiñonez MD  Last diagnosis associated with med order: 1. Essential hypertension  - hydroCHLOROthiazide (HYDRODIURIL) 12.5 MG tablet [Pharmacy Med Name: hydroCHLOROthiazide 12.5 MG Oral Tablet]; Take 1 tablet by mouth once daily  Dispense: 90 tablet; Refill: 0  - losartan (COZAAR) 50 MG tablet [Pharmacy Med Name: Losartan Potassium 50 MG Oral Tablet]; Take 1 tablet by mouth once daily  Dispense: 90 tablet;  Refill: 0    If protocol passes may refill for 12 months if within 3 months of last provider visit (or a total of 15 months).             Passed - Blood pressure on file in past 12 months     BP Readings from Last 1 Encounters:   09/08/19 128/76                losartan (COZAAR) 50 MG tablet [Pharmacy Med Name: Losartan Potassium 50 MG Oral Tablet] 90 tablet 0     Sig: Take 1 tablet by mouth once daily       Angiotensin Receptor Blocker Protocol Failed - 7/29/2020  6:32 PM        Failed - Serum potassium within the past 12 months     No results found for: LN-POTASSIUM          Failed - Serum creatinine within the past 12 months     Creatinine   Date Value Ref Range Status   06/25/2019 0.93 0.70 - 1.30 mg/dL Final             Passed - PCP or prescribing provider visit in past 12 months       Last office visit with prescriber/PCP: 2/9/2018 Ce Quiñonez MD OR same dept: Visit date not found OR same specialty: 2/9/2018 Ce Quiñonez MD  Last physical: Visit date not found Last MTM visit: Visit date not found   Next visit within 3 mo: Visit date not found  Next physical within 3 mo: Visit date not found  Prescriber OR PCP: Ce Quiñonez MD  Last diagnosis associated with med order: 1. Essential hypertension  - hydroCHLOROthiazide (HYDRODIURIL) 12.5 MG tablet [Pharmacy Med Name: hydroCHLOROthiazide 12.5 MG Oral Tablet]; Take 1 tablet by mouth once daily  Dispense: 90 tablet; Refill: 0  - losartan (COZAAR) 50 MG tablet [Pharmacy Med Name: Losartan Potassium 50 MG Oral Tablet]; Take 1 tablet by mouth once daily  Dispense: 90 tablet; Refill: 0    If protocol passes may refill for 12 months if within 3 months of last provider visit (or a total of 15 months).             Passed - Blood pressure filed in past 12 months     BP Readings from Last 1 Encounters:   09/08/19 128/76

## 2021-06-10 NOTE — TELEPHONE ENCOUNTER
RN cannot approve Refill Request    RN can NOT refill this medication Protocol failed and NO refill given. Last office visit: 2/9/2018 Ce Quiñonez MD Last Physical: Visit date not found Last MTM visit: Visit date not found Last visit same specialty: 2/9/2018 Ce Quiñonez MD.  Next visit within 3 mo: Visit date not found  Next physical within 3 mo: Visit date not found      Dee Medeiros, [facility] Triage/Med Refill 8/19/2020    Requested Prescriptions   Pending Prescriptions Disp Refills     EUTHYROX 200 mcg tablet [Pharmacy Med Name: Euthyrox 200 MCG Oral Tablet] 90 tablet 0     Sig: Take 1 tablet by mouth once daily       Thyroid Hormones Protocol Failed - 8/18/2020  4:39 AM        Failed - TSH on file in past 12 months for patient age 12 & older     TSH   Date Value Ref Range Status   06/25/2019 1.22 0.30 - 5.00 uIU/mL Final                   Passed - Provider visit in past 12 months or next 3 months     Last office visit with prescriber/PCP: 2/9/2018 Ce Quiñonez MD OR same dept: Visit date not found OR same specialty: 2/9/2018 Ce Quiñonez MD  Last physical: Visit date not found Last MTM visit: Visit date not found   Next visit within 3 mo: Visit date not found  Next physical within 3 mo: Visit date not found  Prescriber OR PCP: Ce Quiñonez MD  Last diagnosis associated with med order: 1. Postoperative hypothyroidism  - EUTHYROX 200 mcg tablet [Pharmacy Med Name: Euthyrox 200 MCG Oral Tablet]; Take 1 tablet by mouth once daily  Dispense: 90 tablet; Refill: 0    If protocol passes may refill for 12 months if within 3 months of last provider visit (or a total of 15 months).

## 2021-06-10 NOTE — PATIENT INSTRUCTIONS - HE
Stop the Hydrochlorothiazide. Check blood pressure every day for one week. Message me with results.     Message me with name of cream or call pharmacy and ask for refill from derm.       Address:  [address], [address]  [place] [address]

## 2021-06-10 NOTE — PROGRESS NOTES
Assessment/Plan:     Patient presents today for routine physical examination.    Healthcare Maintenance: USPSTF recommendations for age 40;  Patient has been counseled on/screened for:  - intimate partner violence and there are no concerns at this time  - a healthful diet and physical activity for CVD prevention  - Diabetes and hyperlipidemia: screening was performed  Sexually transmitted infections: Patient would not like to be screened for chlamydia, gonorrhea, syphilis, HIV, and hepatitis, HIV ok  Immunizations: up to date except for [id number] which was recommended      Additional concerns are as detailed below:    Patient desires refill on the cream.  I suspect it is a steroid cream but it is not in his chart.  I recommended that he either message me with the name of it and I can refill it from a my chart message or request a refill from the pharmacy who can reach out to the dermatologist originally prescribed it.    1. Postoperative hypothyroidism  - Thyroid Cascade    2. Essential hypertension  Borderline hypotensive today.  Given that he is on a submaximal dose of 2 separate antihypertensive medications, I think that discontinuing 1 and monitoring for 1 week is reasonable.  We chose to discontinue hydrochlorothiazide and patient will stay on losartan at the current dose.  - Comprehensive Metabolic Panel    3. Mixed hyperlipidemia  - Lipid Cascade FASTING    4. Tobacco abuse  Shared decision making, patient is not interested in additional patches or Chantix.  We will trial Wellbutrin and see how he does.  - buPROPion (WELLBUTRIN XL) 150 MG 24 hr tablet; Take 1 tablet (150 mg total) by mouth daily.  Dispense: 30 tablet; Refill: 3      AVS printed and given to patient.  Return to clinic in 1 year but message in 1 week.     I have had an Advance Directives discussion with the patient.    This note has been dictated using voice recognition software. Any grammatical or context distortions are unintentional and  "inherent to the the software.     Ce Quiñonez MD  Family Medicine [facility]    Subjective:     Choco Vu is a 40 y.o. male who presents to clinic for routine physical.    Additional concerns include:    1. Eye dryness with puffiness. He had it last year and was given some medicine after seeing a dermatologist. He would like a refill.     PHQ-2 Score:  0    Health Care Directive: discussed    Patient Care Team:   PCP: Ce Quiñonez     Past Medical History, Family History, and Social History reviewed.     Review of systems is as stated in HPI.  Patient endorses: none  The remainder of the 10 system review is otherwise negative.    Objective:     /82   Pulse 80   Ht 5' 4.5\" (1.638 m)   Wt 196 lb (88.9 kg)   SpO2 98%   BMI 33.12 kg/m    Gen: Alert, NAD, appears stated age, normal hygiene   Eyes: conjunctivae without injection, sclera clear, EOMI  ENT/mouth: nares clear, septum midline, absent rhinorrhea,absent pharyngeal injection, neck is supple, no thyroid enlargement  CV: RRR, no murmur appreciated, pedal edema absent bilaterally  Resp: CTAB, no wheezes, rales or ronchi  ABD: normoactive, non-tender to palpation, nondistended  MSK: grossly full range of motion in all joints, no obvious deformity  Neuro: CN II-XII grossly intact, no deficits in coordination  Psych: no apparent hallucinations or delusions, no pressured speech; alert, oriented x3  SKIN: dry and without lesions; no obvious rashes or significant puffiness surrounding patient's eyes appreciated on today's exam  Heme/lymph: no pallor, no active bleeding/bruising, no adenopathy appreciated    Medications:  Current Outpatient Medications   Medication Sig     atorvastatin (LIPITOR) 40 MG tablet Take 1 tablet (40 mg total) by mouth daily.     EUTHYROX 200 mcg tablet Take 1 tablet by mouth once daily     losartan (COZAAR) 50 MG tablet Take 1 tablet by mouth once daily     buPROPion (WELLBUTRIN XL) 150 MG 24 hr tablet Take 1 " tablet (150 mg total) by mouth daily.       Allergies:  No Known Allergies    PMH:  Past Medical History:   Diagnosis Date     Disease of thyroid gland      Hypertension        PSH:  Past Surgical History:   Procedure Laterality Date     APPENDECTOMY       TOTAL THYROIDECTOMY         Family Hx:  Family History   Problem Relation Age of Onset     Hypertension Mother      No Medical Problems Father        Social History:  Social History     Socioeconomic History     Marital status:      Spouse name: Not on file     Number of children: Not on file     Years of education: Not on file     Highest education level: Not on file   Occupational History     Not on file   Social Needs     Financial resource strain: Not on file     Food insecurity     Worry: Not on file     Inability: Not on file     Transportation needs     Medical: Not on file     Non-medical: Not on file   Tobacco Use     Smoking status: Current Every Day Smoker     Packs/day: 1.00     Smokeless tobacco: Never Used   Substance and Sexual Activity     Alcohol use: Not on file     Comment: 3 beers per month     Drug use: No     Sexual activity: Yes     Partners: Female   Lifestyle     Physical activity     Days per week: Not on file     Minutes per session: Not on file     Stress: Not on file   Relationships     Social connections     Talks on phone: Not on file     Gets together: Not on file     Attends [employer] service: Not on file     Active member of club or organization: Not on file     Attends meetings of clubs or organizations: Not on file     Relationship status: Not on file     Intimate partner violence     Fear of current or ex partner: Not on file     Emotionally abused: Not on file     Physically abused: Not on file     Forced sexual activity: Not on file   Other Topics Concern     Not on file   Social History Narrative     Not on file       LDL Calculated (mg/dL)   Date Value   06/25/2019 82   01/29/2018      Comment:     Invalid,  Triglycerides >400        Immunization History   Administered Date(s) Administered     Influenza,seasonal,quad inj =/> 6months 02/09/2018     MMR 10/14/1992     Tdap 02/09/2018     There are no preventive care reminders to display for this patient.

## 2021-06-12 NOTE — TELEPHONE ENCOUNTER
Refill Approved    Rx renewed per Medication Renewal Policy. Medication was last renewed on 06/25/2019.  Last office visit was 08/24/2020 with PCP.    Ann Marie Garrison, [facility] Triage/Med Refill 10/6/2020     Requested Prescriptions   Pending Prescriptions Disp Refills     atorvastatin (LIPITOR) 40 MG tablet [Pharmacy Med Name: Atorvastatin Calcium 40 MG Oral Tablet] 90 tablet 0     Sig: Take 1 tablet by mouth once daily       Statins Refill Protocol (Hmg CoA Reductase Inhibitors) Passed - 10/3/2020  9:55 AM        Passed - PCP or prescribing provider visit in past 12 months      Last office visit with prescriber/PCP: Visit date not found OR same dept: Visit date not found OR same specialty: 2/9/2018 Ce Quiñonez MD  Last physical: 6/25/2019 Last MTM visit: Visit date not found   Next visit within 3 mo: Visit date not found  Next physical within 3 mo: Visit date not found  Prescriber OR PCP: Henna Christianson CNP  Last diagnosis associated with med order: 1. Mixed hyperlipidemia  - atorvastatin (LIPITOR) 40 MG tablet [Pharmacy Med Name: Atorvastatin Calcium 40 MG Oral Tablet]; Take 1 tablet by mouth once daily  Dispense: 90 tablet; Refill: 0    If protocol passes may refill for 12 months if within 3 months of last provider visit (or a total of 15 months).

## 2021-06-13 NOTE — TELEPHONE ENCOUNTER
Refill Approved    Rx renewed per Medication Renewal Policy. Medication was last renewed on 7/31/20.    Dee Medeiros, [facility] Triage/Med Refill 11/23/2020     Requested Prescriptions   Pending Prescriptions Disp Refills     losartan (COZAAR) 50 MG tablet [Pharmacy Med Name: Losartan Potassium 50 MG Oral Tablet] 90 tablet 0     Sig: Take 1 tablet by mouth once daily       Angiotensin Receptor Blocker Protocol Passed - 11/22/2020  1:10 PM        Passed - PCP or prescribing provider visit in past 12 months       Last office visit with prescriber/PCP: 2/9/2018 Ce Quiñonez MD OR same dept: Visit date not found OR same specialty: 2/9/2018 Ce Quiñonez MD  Last physical: 8/24/2020 Last MTM visit: Visit date not found   Next visit within 3 mo: Visit date not found  Next physical within 3 mo: Visit date not found  Prescriber OR PCP: Ce Quiñonez MD  Last diagnosis associated with med order: 1. Essential hypertension  - losartan (COZAAR) 50 MG tablet [Pharmacy Med Name: Losartan Potassium 50 MG Oral Tablet]; Take 1 tablet by mouth once daily  Dispense: 90 tablet; Refill: 0    If protocol passes may refill for 12 months if within 3 months of last provider visit (or a total of 15 months).             Passed - Serum potassium within the past 12 months     Lab Results   Component Value Date    Potassium 3.9 08/24/2020             Passed - Blood pressure filed in past 12 months     BP Readings from Last 1 Encounters:   08/24/20 104/82             Passed - Serum creatinine within the past 12 months     Creatinine   Date Value Ref Range Status   08/24/2020 0.82 0.70 - 1.30 mg/dL Final

## 2021-06-14 NOTE — TELEPHONE ENCOUNTER
Refill Approved    Rx renewed per Medication Renewal Policy. Medication was last renewed on 8/20/20.    Dee Medeiros, [facility] Triage/Med Refill 1/25/2021     Requested Prescriptions   Pending Prescriptions Disp Refills     levothyroxine (SYNTHROID, LEVOTHROID) 200 MCG tablet [Pharmacy Med Name: Levothyroxine Sodium 200 MCG Oral Tablet] 90 tablet 0     Sig: Take 1 tablet by mouth once daily       Thyroid Hormones Protocol Passed - 1/25/2021  5:30 AM        Passed - Provider visit in past 12 months or next 3 months     Last office visit with prescriber/PCP: 2/9/2018 Ce Quiñonez MD OR same dept: Visit date not found OR same specialty: 2/9/2018 Ce Quiñonez MD  Last physical: 8/24/2020 Last MTM visit: Visit date not found   Next visit within 3 mo: Visit date not found  Next physical within 3 mo: Visit date not found  Prescriber OR PCP: Ce Quiñonez MD  Last diagnosis associated with med order: 1. Postoperative hypothyroidism  - levothyroxine (SYNTHROID, LEVOTHROID) 200 MCG tablet [Pharmacy Med Name: Levothyroxine Sodium 200 MCG Oral Tablet]; Take 1 tablet by mouth once daily  Dispense: 90 tablet; Refill: 0    If protocol passes may refill for 12 months if within 3 months of last provider visit (or a total of 15 months).             Passed - TSH on file in past 12 months for patient age 12 & older     TSH   Date Value Ref Range Status   08/24/2020 4.99 0.30 - 5.00 uIU/mL Final

## 2021-06-15 NOTE — PROGRESS NOTES
Assessment/Plan:       1. History of total thyroidectomy    2. Postoperative hypothyroidism  Patient has a significant history of hypothyroidism secondary to a total thyroidectomy due to Graves' disease.  I gave him a refill of his levothyroxine at 200 mcg.  I gave him a 90 day supply so he could have 3 months to be able to get established with a primary care provider.  In addition I check a TSH and have him return to the clinic again in 4-6 weeks to have this rechecked again to assess for any changes with taking the medication on a daily basis I do anticipate that his thyroid level is going to be abnormal at this time.  He is in agreement this plan.  - levothyroxine (SYNTHROID, LEVOTHROID) 200 MCG tablet; Take 1 tablet (200 mcg total) by mouth daily.  Dispense: 90 tablet; Refill: 0  - Thyroid Stimulating Hormone (TSH)    3. Hypertension  Significant history of hypertension and he is hypertensive today in clinic.  Given the history of having a cough with the lisinopril I opted to start losartan with hydrochlorothiazide with him.  I will start on a lower dose as his blood pressure may respond well to a medication.  I discussed with the patient that I would like him to check his blood pressures when he is at home and send me his blood pressure readings from his at home BP cuff.  I would prefer if he came into the clinic to have his blood pressure checked however due to timing with his work it may be difficult for him.  A 90 day supply was given of this medication without any refills.  I will check his kidney function as below.  I also discussed the patient that he could go to walk-in care and have his blood pressure checked over the weekend.  This would be convenient for him as [place] on the side of [place].  He is in agreement this plan.  - losartan-hydrochlorothiazide (HYZAAR) 50-12.5 mg per tablet; Take 1 tablet by mouth daily.  Dispense: 90 tablet; Refill: 0  - Basic Metabolic Panel    4.  Screening for lipid disorders  No history of having his cholesterol screened.  I will place this order today to have a cholesterol checked.  - Lipid Cascade    5. Smoker  I discussed smoking cessation with the patient.  He is currently pre-contemplative of this.  I discussed trying to decrease the amount of cigarettes he smokes on a daily basis and cutting back from 1 pack per day to eventually smoke-free. Ready to quit: No  Counseling given: Yes    I spent approximately 5 minutes discussing smoking cessation counseling. I have counseled the patient for tobacco cessation and the follow up will occur  at the next visit.    6. Obesity (BMI 30.0-34.9)  Patient was counseled on continuing his efforts at weight loss to help decrease his overall risk factors and help improve blood pressure management.  I encouraged a balanced diet with regular cardiovascular exercise.  I discussed the patient to focus on a diet that was higher in protein and low in sugar and carbs. The following high BMI interventions were performed this visit: encouragement to exercise, dietary management education, guidance, and counseling and lifestyle education regarding diet          Subjective:      Choco Vu is a 37 y.o. male who presents for concerns of medication refills.  He moved from [place] in April 2017.  Due to lack of insurance and then changing jobs he has been unable to schedule an appointment sooner than now.  He is a history of a total thyroidectomy secondary to having Graves' disease.  He had a total thyroidectomy in 2012.  He has been on Synthroid since this time and has had some medication dose adjustments however has been on 200 mcg of levothyroxine for quite some time.  Due to running out of medication he has been taking this every other day for the last several months.  He would like a refill of his levothyroxine.  He also has a significant medical history of hypertension.  When he moved from [place] he weighed 218 pounds  and is now 188 pounds.  He believed that he did not require the medication any longer since he had lost a significant amount of weight.  He has not been checking his blood pressure on a regular basis but reports that today's reading of 150/100 is higher than it typically is.  He was previously on lisinopril and remarks that he had developed a dry cough.  He denies any chest pain, shortness of breath, or difficulty breathing.  He also denies any headaches, changes in bowel function, changes in bladder function.  He was previously in the  and is now .  He is no longer in the  due to having his thyroidectomy he was unable to complete the basic physical exam in the time frame to reerenny.  He is now working as a  long hours Monday through Friday.  He reports that he has a difficult time making it into the doctor due to the hours at his work.  He has been at this job for now 4 months.  He reports that he would prefer to establish care at the [facility] as he lives over in that area.  He came in today as he had the day off from work and had the time to make it into a clinic appointment.    The following portions of the patient's history were reviewed and updated as appropriate: allergies, current medications, past family history, past medical history, past social history, past surgical history and problem list.    Past Medical History:   Diagnosis Date     Hypertension      Past Surgical History:   Procedure Laterality Date     APPENDECTOMY       TOTAL THYROIDECTOMY       Social History     Social History     Marital status:      Spouse name: N/A     Number of children: N/A     Years of education: N/A     Occupational History     Not on file.     Social History Main Topics     Smoking status: Current Every Day Smoker     Packs/day: 1.00     Smokeless tobacco: Never Used     Alcohol use Not on file      Comment: 3 beers per month     Drug use: No     Sexual activity: Yes      "Partners: Female     Other Topics Concern     Not on file     Social History Narrative     No narrative on file     Current Outpatient Prescriptions   Medication Sig     levothyroxine (SYNTHROID, LEVOTHROID) 200 MCG tablet Take 1 tablet (200 mcg total) by mouth daily.     losartan-hydrochlorothiazide (HYZAAR) 50-12.5 mg per tablet Take 1 tablet by mouth daily.     Family History   Problem Relation Age of Onset     Hypertension Mother      Review of Systems   Pertinent items are noted in HPI.      Objective:      BP (!) 150/102 (Patient Site: Right Arm, Patient Position: Sitting, Cuff Size: Adult Large)  Pulse 80  Resp 16  Ht 5' 4\" (1.626 m)  Wt 188 lb 9.6 oz (85.5 kg)  BMI 32.37 kg/m2    General appearance: alert, appears stated age and cooperative  Head: Normocephalic, without obvious abnormality, atraumatic  Lungs: clear to auscultation bilaterally  Heart: regular rate and rhythm, S1, S2 normal, no murmur, click, rub or gallop  Extremities: extremities normal, atraumatic, no cyanosis or edema  Pulses: 2+ and symmetric  Skin: Skin color, texture, turgor normal. No rashes or lesions  Neurologic: Grossly normal       "

## 2021-06-15 NOTE — PROGRESS NOTES
Assessment/Plan:     Patient presents today for routine physical examination.    Additional concerns are as detailed below:    hypertension: Likely secondary to patient now having appropriate blood pressure control, encouraged hydration with this usually happens when he is at work.  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  Recommended a Thurmes to keep water at an appropriate temperature.  Patient will try this and if it does not work, he will come back in.    Postoperative hypothyroidism: TSH from 10 days ago was almost 100, initiated on 200 mcg levothyroxine daily, we will retest within 3 weeks.  Low threshold to consult with endocrinology given the severity of the patient's elevated TSH.    Hyperlipidemia: No changes made to the 40 mg atorvastatin    Fertility testing: Patient will present to [facility] for semen testing    Healthcare maintenance:  -Tdap and influenza immunizations order today  - Routine labs already performed      Problem List Items Addressed This Visit        ENT/CARD/PULM/ENDO Problems    Postoperative hypothyroidism    Relevant Orders    Thyroid Stimulating Hormone (TSH)    Essential hypertension    Mixed hyperlipidemia      Other Visit Diagnoses     Establishing care with new doctor, encounter for    -  Primary    Fertility testing        Relevant Orders    Semen Analysis          Return to clinic in 12 weeks.    Total time spent with patient was 25 minutes with greater than 50% spent in face-to-face counseling regarding the above plan.    This note has been dictated using voice recognition software. Any grammatical or context distortions are unintentional and inherent to the the software.     Ce Quiñonez MD  Family Medicine [facility]    Subjective:     Choco Vu is a 37 y.o. male who presents to clinic for routine physical.    Additional concerns include:    1. Dizziness: suspects it is due to his new blood pressure medication; postural orthostatics; feels off  "balance/lightheaded; occurs when he will change positions    2. Fertility: previous doctor has encouraged a semen analysis    3. Hypothyroid: lost insurance and had not previously taken his thyroid medication but is now back to 200 mcg levothyroxine a day      Past Medical History, Family History, and Social History reviewed.     Review of systems is as stated in HPI.  Patient endorses: dizziness, leg cramps, joint swelling  The remainder of the 10 system review is otherwise negative.    Objective:     /86  Pulse 80  Ht 5' 4.25\" (1.632 m)  Wt 186 lb (84.4 kg)  SpO2 98%  BMI 31.68 kg/m2  Gen: Alert, NAD, appears stated age, normal hygiene   Eyes: conjunctivae without injection, sclera clear, EOMI  ENT/mouth: nares clear, septum midline, absent rhinorrhea, throat without injection, neck is supple, no thyroid enlargement  CV: RRR, no murmur appreciated, pedal edema absent bilaterally  Resp: CTAB, no wheezes, rales or ronchi  ABD: normoactive, non-tender to palpation, nondistended  MSK: grossly full range of motion in all joints, no obvious deformity  Neuro: CN II-XII grossly intact, no deficits in coordination  Psych: no apparent hallucinations or delusions, no pressured speech; alert, oriented x3  SKIN: dry and without lesions  Heme/lymph: no pallor, no active bleeding/bruising, no adenopathy appreciated    Medications:  Current Outpatient Prescriptions   Medication Sig     atorvastatin (LIPITOR) 40 MG tablet Take 1 tablet (40 mg total) by mouth daily.     levothyroxine (SYNTHROID, LEVOTHROID) 200 MCG tablet Take 1 tablet (200 mcg total) by mouth daily.     losartan-hydrochlorothiazide (HYZAAR) 50-12.5 mg per tablet Take 1 tablet by mouth daily.       Allergies:  No Known Allergies    PMH:  Past Medical History:   Diagnosis Date     Hypertension        PSH:  Past Surgical History:   Procedure Laterality Date     APPENDECTOMY       TOTAL THYROIDECTOMY         Family Hx:  Family History   Problem Relation Age " of Onset     Hypertension Mother        Social History:  Social History     Social History     Marital status:      Spouse name: N/A     Number of children: N/A     Years of education: N/A     Occupational History     Not on file.     Social History Main Topics     Smoking status: Current Every Day Smoker     Packs/day: 1.00     Smokeless tobacco: Never Used     Alcohol use Not on file      Comment: 3 beers per month     Drug use: No     Sexual activity: Yes     Partners: Female     Other Topics Concern     Not on file     Social History Narrative       Patient Care Team:   PCP: Ce Quiñonez MD    LDL Calculated (mg/dL)   Date Value   01/29/2018      Comment:     Invalid, Triglycerides >400        Immunization History   Administered Date(s) Administered     MMR 10/14/1992     There are no preventive care reminders to display for this patient.

## 2021-06-16 PROBLEM — Z72.0 TOBACCO ABUSE: Status: ACTIVE | Noted: 2020-08-24

## 2021-06-16 PROBLEM — E78.2 MIXED HYPERLIPIDEMIA: Status: ACTIVE | Noted: 2018-02-09

## 2021-06-16 PROBLEM — E89.0 POSTOPERATIVE HYPOTHYROIDISM: Status: ACTIVE | Noted: 2018-02-09

## 2021-06-16 PROBLEM — I10 ESSENTIAL HYPERTENSION: Status: ACTIVE | Noted: 2018-02-09

## 2021-06-17 NOTE — PATIENT INSTRUCTIONS - HE
Patient Instructions by Clint Zhong PA-C at 8/30/2019  3:20 PM     Author: Clint Zhong PA-C Service: -- Author Type: Physician Assistant    Filed: 8/30/2019  4:00 PM Encounter Date: 8/30/2019 Status: Addendum    : Clint Zhong PA-C (Physician Assistant)    Related Notes: Original Note by Clint Zhong PA-C (Physician Assistant) filed at 8/30/2019  4:00 PM          Discussed differentials and treatment options, lesions are benign and patient is reassured.  Take medication as prescribed, and recheck with PCP if not resolving as expected, sooner with Walk-In Clinic or Emergency Room if worsening.      Atopic Dermatitis:    This is usually an on-going tendency made worse with dry weather and heat. Typically, it never is cured, but can be managed effectively. Advised tepid water for bathing and immediate application of moisturizing cream post bathing such as Eucerin Aquaphor. Adequately moisturizing the skin with creams will greatly diminish pruritis and rash development.      May sparingly use topical hydrocortisone cream to particularly pruritic or inflamed areas. The key to management is adequate skin hydration. Follow-up with primary for future concerns.               Patient Education     Atopic Dermatitis (Adult)  Atopic dermatitis is a dry, itchy, red rash. Its also called eczema. The rash is chronic, or ongoing. It can come and go over time. The disease is often passed down in families. It causes a problem with the skin barrier that makes the skin more sensitive to the environment and other factors. The increased skin sensitivity causes an itch, which causes scratching. Scratching can worsen the itching or also break the skin. This can put the skin at risk of infection.  The condition is most common in people with asthma, hay fever, hives, or dry or sensitive skin. The rash may be caused by extreme heat or heavy sweating. Skin irritants can cause the rash to flare up. These can include wool or silk  clothing, grease, oils, some medicines, and harsh soaps and detergents. Emotional stress can also be a trigger.  Treatment is done to relieve the itching and inflammation of the skin.  Home care  Follow these tips to care for your condition:    Keep the areas of rash clean by bathing at least every other day. Use lukewarm water to bathe. Dont use hot water, which can dry out the skin.    Dont use soaps with strong detergents. Use mild soaps made for sensitive skin.    Apply a cream or ointment to damp skin right after bathing.    Avoid things that irritate your skin. Wear absorbent, soft fabrics next to the skin rather than rough or scratchy materials.    Use mild laundry soap free of scents and perfumes. Make sure to rinse all the soap out of your clothes.    Treat any skin infection as directed.    Use oral diphenhydramine to help reduce itching. This is an antihistamine you can buy at drug and grocery stores. It can make you sleepy, so use lower doses during the daytime. Or you can use loratadine. This is an antihistamine that will not make you sleepy. Do not use diphenhydramine if you have glaucoma or have trouble urinating due to an enlarged prostate.  Follow-up care  See your healthcare provider, or as advised. If your symptoms dont get better or if they get worse in the next 7 days, make an appointment with your healthcare provider.  When to seek medical advice  Call your healthcare provider right away  if any of these occur:    Increasing area of redness or pain in the skin    Yellow crusts or wet drainage from the rash    Fever of 100.4 F (38 C) or higher, or as directed by your healthcare provider  Date Last Reviewed: 9/1/2016 2000-2017 [employer]. [address] [place] [address], [place] [address]. All rights reserved. This information is not intended as a substitute for professional medical care. Always follow your healthcare professional's instructions.           Patient Education     Managing  Atopic Dermatitis (Eczema)     After bathing, gently pat your skin dry (dont rub). Apply moisturizer while your skin is still damp.   To manage your symptoms and help reduce the severity and frequency, try these self-care tips:  Caring for your skin    Use a gentle, fragrance-free cleanser (or nonsoap cleanser) for bathing. Rinse well. Pat skin dry.    Take warm, not hot, baths or showers. Try to limit them to no more that 10 to 15 minutes.     Use moisturizer liberally right after you bathe, while your skin is still damp.    Avoid scratching because it will cause more damage to your skin.     Topical, over-the-counter hydrocortisone cream may help control mild symptoms.   Controlling your environment    Avoid extreme heat or cold.    Avoid very humid or very dry air.    If your home or office air is very dry, use a humidifier.    Avoid allergens, such as dust, that may be present in bedding, carpets, plush toys, or rugs.    Know that pet hair and dander can cause flare-ups.  Seeking medical treatment  Another way to keep symptoms under control is to seek medical treatment. Talk with your healthcare provider about the type of treatment that may work best for you. Your provider may prescribe treatments such as the following:    Topical treatments to put on the skin daily    Medicines taken by mouth (oral medicines), such as antihistamines, antibiotics, or corticosteroids    In severe cases shots (injections) may be needed to control the symptoms. You may even need antibiotics if skin infections occur.  Treatments dont work the same way for every person. So if your symptoms continue or get worse, ask your healthcare provider about other treatments.  Making lifestyle choices    Manage the stress in your life.    Wear loose-fitting cotton clothing that does not bind or rub your skin.    Avoid contact with wool or other scratchy fabrics.    Use fragrance-free products.  Getting good results  Now that you know more about  atopic dermatitis, the next step is up to you. Follow your healthcare providers treatment plan and your self-care routine. This will help bring atopic dermatitis under control. If your symptoms persist, be sure to let your health care provider know.   Date Last Reviewed: 2/1/2017 2000-2017 The "[employer]. [address] [place] [address], [place] [address]. All rights reserved. This information is not intended as a substitute for professional medical care. Always follow your healthcare professional's instructions.

## 2021-06-19 NOTE — LETTER
Letter by Henna Christianson CNP at      Author: Henna Christianson CNP Service: -- Author Type: --    Filed:  Encounter Date: 6/30/2019 Status: (Other)         Choco Vu  [address]  [place] [address]             June 30, 2019         Dear Mr. Vu,    Below are the results from your recent visit:    Resulted Orders   Thyroid Cascade   Result Value Ref Range    TSH 1.22 0.30 - 5.00 uIU/mL   Lipid Cascade   Result Value Ref Range    Cholesterol 151 <=199 mg/dL    Triglycerides 158 (H) <=149 mg/dL    HDL Cholesterol 37 (L) >=40 mg/dL    LDL Calculated 82 <=129 mg/dL    Patient Fasting > 8hrs? Yes    Comprehensive Metabolic Panel   Result Value Ref Range    Sodium 137 136 - 145 mmol/L    Potassium 3.9 3.5 - 5.0 mmol/L    Chloride 101 98 - 107 mmol/L    CO2 25 22 - 31 mmol/L    Anion Gap, Calculation 11 5 - 18 mmol/L    Glucose 95 70 - 125 mg/dL    BUN 14 8 - 22 mg/dL    Creatinine 0.93 0.70 - 1.30 mg/dL    GFR MDRD Af Amer >60 >60 mL/min/1.73m2    GFR MDRD Non Af Amer >60 >60 mL/min/1.73m2    Bilirubin, Total 0.7 0.0 - 1.0 mg/dL    Calcium 10.4 8.5 - 10.5 mg/dL    Protein, Total 7.6 6.0 - 8.0 g/dL    Albumin 3.5 3.5 - 5.0 g/dL    Alkaline Phosphatase 72 45 - 120 U/L    AST 24 0 - 40 U/L    ALT 46 (H) 0 - 45 U/L    Narrative    Fasting Glucose reference range is 70-99 mg/dL per  American Diabetes Association (ADA) guidelines.       Your TSH (thyroid lab) is normal.      Your triglycerides (a part of your cholesterol) remains mildly elevated.  .  I recommend you consume a healthy low-fat diet (avoid fast food, fried foods, processed foods, and butter) and exercise.  Avoiding alcohol will help to lower this.      Your ALT (liver enzyme) is mildly elevated.  I recommend limiting alcohol and Tylenol intake.  We will continue to monitor this.        Please call with questions or contact us using [employer].    Sincerely,        Electronically signed by Henna Christianson CNP

## 2021-06-20 NOTE — LETTER
Letter by Ce Quiñonez MD at      Author: Ce Quiñonez MD Service: -- Author Type: --    Filed:  Encounter Date: 8/20/2020 Status: (Other)         Choco Vu  [address]  [place] [address]      August 20, 2020      Dear Choco Vu,    Per our refill protocol, you are due for a medication check office visit. Your prescription for EUTHROX was sent to your pharmacy (08.20.2020. Please call [phone] to schedule an LAB ONLY visit.  Before your next refill is needed to avoid delays.    Thank you,  [facility]

## 2021-06-27 NOTE — PROGRESS NOTES
Progress Notes by Clint Zhong PA-C at 8/30/2019  3:20 PM     Author: Clint Zhong PA-C Service: -- Author Type: Physician Assistant    Filed: 8/30/2019  5:33 PM Encounter Date: 8/30/2019 Status: Signed    : Clint Zhong PA-C (Physician Assistant)       Subjective:      Patient ID: Choco Vu is a 39 y.o. male.    Chief Complaint:    HPI  Choco Vu is a 39 y.o. male who presents today complaining of dry flaky skin on the penis and scrotum as well as the left nipple of his chest.  Patient states that he has had some difficulty with this going back for the last month.  He works as a  on a truck.  It has been hot and humid and he has had a lot of sweating and irritation of the chest and the scrotum and penis while he is working.  He was seen in the emergency room on 7/23 2019 and treated with Diflucan 150 mg once weekly and topical antifungal.  Patient states that initially started working the first week but then has returned and did not completely resolve his symptoms.    At this time he denies any constitutional symptoms to include fever chills night sweats and fatigue.  He has no other involvement anywhere else on the body except for the right posterior popliteal fossa.  Does not have a history of psoriasis.  He has had a previous history of eczema with irritation on different parts of the body in the past.      Past Medical History:   Diagnosis Date   ? Hypertension        Past Surgical History:   Procedure Laterality Date   ? APPENDECTOMY     ? TOTAL THYROIDECTOMY         Family History   Problem Relation Age of Onset   ? Hypertension Mother    ? No Medical Problems Father        Social History     Tobacco Use   ? Smoking status: Current Every Day Smoker     Packs/day: 1.00   ? Smokeless tobacco: Never Used   Substance Use Topics   ? Alcohol use: Not on file     Comment: 3 beers per month   ? Drug use: No       Review of Systems  As above in HPI, otherwise balance of Review of  "Systems are negative.    Objective:     /89 (Patient Site: Right Arm, Patient Position: Sitting, Cuff Size: Adult Large)   Pulse 68   Temp 97.8  F (36.6  C) (Oral)   Resp 18   Ht 5' 4.5\" (1.638 m)   Wt 194 lb (88 kg)   SpO2 96%   BMI 32.79 kg/m      Physical Exam     General: Patient is resting comfortably no acute distress, is afebrile  HEENT: Head is normocephalic atraumatic   eyes are PERRL EOMI sclera anicteric   Skin: With eczematous type rash on the right popliteal fossa otherwise he has no involvement of the hands or the feet the gluteal cleft  On the penis and scrotum he does have scaling on the scrotum and on the shaft of the penis.  The glans penis is non-irritated       Assessment:     Procedures    The encounter diagnosis was Other eczema.    Plan:     1. Other eczema         Had a conversation with the patient regarding his dry skin and rash.  He will use an emollient on the penis and scrotum.  Since he did not get good relief with the treatment for candidal skin infection we will try an eczematous approach since he does appear to have eczema on the popliteal fossa and it can involve the scrotum and the penis and the nipple.  He also will use over-the-counter hydrocortisone to the affected areas.  Following up with his primary care provider if not getting 100% resolution of new symptoms or concerns arise.    Patient Instructions        Discussed differentials and treatment options, lesions are benign and patient is reassured.  Take medication as prescribed, and recheck with PCP if not resolving as expected, sooner with Walk-In Clinic or Emergency Room if worsening.      Atopic Dermatitis:    This is usually an on-going tendency made worse with dry weather and heat. Typically, it never is cured, but can be managed effectively. Advised tepid water for bathing and immediate application of moisturizing cream post bathing such as Eucerin Aquaphor. Adequately moisturizing the skin with creams " will greatly diminish pruritis and rash development.      May sparingly use topical hydrocortisone cream to particularly pruritic or inflamed areas. The key to management is adequate skin hydration. Follow-up with primary for future concerns.               Patient Education     Atopic Dermatitis (Adult)  Atopic dermatitis is a dry, itchy, red rash. Its also called eczema. The rash is chronic, or ongoing. It can come and go over time. The disease is often passed down in families. It causes a problem with the skin barrier that makes the skin more sensitive to the environment and other factors. The increased skin sensitivity causes an itch, which causes scratching. Scratching can worsen the itching or also break the skin. This can put the skin at risk of infection.  The condition is most common in people with asthma, hay fever, hives, or dry or sensitive skin. The rash may be caused by extreme heat or heavy sweating. Skin irritants can cause the rash to flare up. These can include wool or silk clothing, grease, oils, some medicines, and harsh soaps and detergents. Emotional stress can also be a trigger.  Treatment is done to relieve the itching and inflammation of the skin.  Home care  Follow these tips to care for your condition:    Keep the areas of rash clean by bathing at least every other day. Use lukewarm water to bathe. Dont use hot water, which can dry out the skin.    Dont use soaps with strong detergents. Use mild soaps made for sensitive skin.    Apply a cream or ointment to damp skin right after bathing.    Avoid things that irritate your skin. Wear absorbent, soft fabrics next to the skin rather than rough or scratchy materials.    Use mild laundry soap free of scents and perfumes. Make sure to rinse all the soap out of your clothes.    Treat any skin infection as directed.    Use oral diphenhydramine to help reduce itching. This is an antihistamine you can buy at drug and grocery stores. It can make you  sleepy, so use lower doses during the daytime. Or you can use loratadine. This is an antihistamine that will not make you sleepy. Do not use diphenhydramine if you have glaucoma or have trouble urinating due to an enlarged prostate.  Follow-up care  See your healthcare provider, or as advised. If your symptoms dont get better or if they get worse in the next 7 days, make an appointment with your healthcare provider.  When to seek medical advice  Call your healthcare provider right away  if any of these occur:    Increasing area of redness or pain in the skin    Yellow crusts or wet drainage from the rash    Fever of 100.4 F (38 C) or higher, or as directed by your healthcare provider  Date Last Reviewed: 9/1/2016 2000-2017 [employer]. [address] [place] [address], [place] [address]. All rights reserved. This information is not intended as a substitute for professional medical care. Always follow your healthcare professional's instructions.           Patient Education     Managing Atopic Dermatitis (Eczema)     After bathing, gently pat your skin dry (dont rub). Apply moisturizer while your skin is still damp.   To manage your symptoms and help reduce the severity and frequency, try these self-care tips:  Caring for your skin    Use a gentle, fragrance-free cleanser (or nonsoap cleanser) for bathing. Rinse well. Pat skin dry.    Take warm, not hot, baths or showers. Try to limit them to no more that 10 to 15 minutes.     Use moisturizer liberally right after you bathe, while your skin is still damp.    Avoid scratching because it will cause more damage to your skin.     Topical, over-the-counter hydrocortisone cream may help control mild symptoms.   Controlling your environment    Avoid extreme heat or cold.    Avoid very humid or very dry air.    If your home or office air is very dry, use a humidifier.    Avoid allergens, such as dust, that may be present in bedding, carpets, plush toys, or  rugs.    Know that pet hair and dander can cause flare-ups.  Seeking medical treatment  Another way to keep symptoms under control is to seek medical treatment. Talk with your healthcare provider about the type of treatment that may work best for you. Your provider may prescribe treatments such as the following:    Topical treatments to put on the skin daily    Medicines taken by mouth (oral medicines), such as antihistamines, antibiotics, or corticosteroids    In severe cases shots (injections) may be needed to control the symptoms. You may even need antibiotics if skin infections occur.  Treatments dont work the same way for every person. So if your symptoms continue or get worse, ask your healthcare provider about other treatments.  Making lifestyle choices    Manage the stress in your life.    Wear loose-fitting cotton clothing that does not bind or rub your skin.    Avoid contact with wool or other scratchy fabrics.    Use fragrance-free products.  Getting good results  Now that you know more about atopic dermatitis, the next step is up to you. Follow your healthcare providers treatment plan and your self-care routine. This will help bring atopic dermatitis under control. If your symptoms persist, be sure to let your health care provider know.   Date Last Reviewed: 2/1/2017 2000-2017 [employer]. [address] [place] [address], [place] [address]. All rights reserved. This information is not intended as a substitute for professional medical care. Always follow your healthcare professional's instructions.

## 2021-10-16 ENCOUNTER — HEALTH MAINTENANCE LETTER (OUTPATIENT)
Age: 41
End: 2021-10-16

## 2021-10-27 DIAGNOSIS — E89.0 POSTOPERATIVE HYPOTHYROIDISM: ICD-10-CM

## 2021-10-28 RX ORDER — LEVOTHYROXINE SODIUM 200 UG/1
TABLET ORAL
Qty: 30 TABLET | Refills: 0 | Status: SHIPPED | OUTPATIENT
Start: 2021-10-28 | End: 2021-12-20

## 2021-12-01 DIAGNOSIS — E78.2 MIXED HYPERLIPIDEMIA: ICD-10-CM

## 2021-12-02 RX ORDER — ATORVASTATIN CALCIUM 40 MG/1
TABLET, FILM COATED ORAL
Qty: 90 TABLET | Refills: 0 | Status: SHIPPED | OUTPATIENT
Start: 2021-12-02 | End: 2021-12-03

## 2021-12-02 NOTE — TELEPHONE ENCOUNTER
"Routing refill request to provider for review/approval because:  Labs not current:  LDL  Patient needs to be seen because it has been more than 1 year since last office visit.    Last Written Prescription Date:  10/6/20  Last Fill Quantity: 90,  # refills: 3   Last office visit provider:  8/24/20     Requested Prescriptions   Pending Prescriptions Disp Refills     atorvastatin (LIPITOR) 40 MG tablet [Pharmacy Med Name: Atorvastatin Calcium 40 MG Oral Tablet] 90 tablet 0     Sig: Take 1 tablet by mouth once daily       Statins Protocol Failed - 12/1/2021  5:58 AM        Failed - LDL on file in past 12 months     Recent Labs   Lab Test 08/24/20  0802   LDL 97             Passed - No abnormal creatine kinase in past 12 months     No lab results found.             Passed - Recent (12 mo) or future (30 days) visit within the authorizing provider's specialty     Patient has had an office visit with the authorizing provider or a provider within the authorizing providers department within the previous 12 mos or has a future within next 30 days. See \"Patient Info\" tab in inbasket, or \"Choose Columns\" in Meds & Orders section of the refill encounter.              Passed - Medication is active on med list        Passed - Patient is age 18 or older             Dell Brown RN 12/02/21 2:54 PM  "

## 2021-12-03 ENCOUNTER — OFFICE VISIT (OUTPATIENT)
Dept: FAMILY MEDICINE | Facility: CLINIC | Age: 41
End: 2021-12-03
Payer: COMMERCIAL

## 2021-12-03 VITALS
WEIGHT: 201 LBS | DIASTOLIC BLOOD PRESSURE: 76 MMHG | HEART RATE: 85 BPM | BODY MASS INDEX: 33.97 KG/M2 | OXYGEN SATURATION: 98 % | SYSTOLIC BLOOD PRESSURE: 112 MMHG

## 2021-12-03 DIAGNOSIS — I10 ESSENTIAL HYPERTENSION: ICD-10-CM

## 2021-12-03 DIAGNOSIS — E89.0 POSTOPERATIVE HYPOTHYROIDISM: Primary | ICD-10-CM

## 2021-12-03 DIAGNOSIS — E78.2 MIXED HYPERLIPIDEMIA: ICD-10-CM

## 2021-12-03 DIAGNOSIS — Z72.0 TOBACCO ABUSE: ICD-10-CM

## 2021-12-03 DIAGNOSIS — Z11.59 NEED FOR HEPATITIS C SCREENING TEST: ICD-10-CM

## 2021-12-03 DIAGNOSIS — Z00.00 ROUTINE HEALTH MAINTENANCE: ICD-10-CM

## 2021-12-03 PROBLEM — B35.3 TINEA PEDIS: Status: ACTIVE | Noted: 2021-12-03

## 2021-12-03 PROBLEM — C73 MALIGNANT NEOPLASM OF THYROID GLAND (H): Status: ACTIVE | Noted: 2021-12-03

## 2021-12-03 PROBLEM — E03.9 HYPOTHYROID: Status: ACTIVE | Noted: 2021-12-03

## 2021-12-03 PROBLEM — E05.00 GRAVES' DISEASE: Status: ACTIVE | Noted: 2021-12-03

## 2021-12-03 PROBLEM — H52.229 REGULAR ASTIGMATISM: Status: ACTIVE | Noted: 2021-12-03

## 2021-12-03 LAB
ALBUMIN SERPL-MCNC: 4.3 G/DL (ref 3.5–5)
ALP SERPL-CCNC: 81 U/L (ref 45–120)
ALT SERPL W P-5'-P-CCNC: 36 U/L (ref 0–45)
ANION GAP SERPL CALCULATED.3IONS-SCNC: 13 MMOL/L (ref 5–18)
AST SERPL W P-5'-P-CCNC: 20 U/L (ref 0–40)
BILIRUB SERPL-MCNC: 0.6 MG/DL (ref 0–1)
BUN SERPL-MCNC: 13 MG/DL (ref 8–22)
CALCIUM SERPL-MCNC: 10 MG/DL (ref 8.5–10.5)
CHLORIDE BLD-SCNC: 103 MMOL/L (ref 98–107)
CHOLEST SERPL-MCNC: 282 MG/DL
CO2 SERPL-SCNC: 22 MMOL/L (ref 22–31)
CREAT SERPL-MCNC: 0.94 MG/DL (ref 0.7–1.3)
FASTING STATUS PATIENT QL REPORTED: ABNORMAL
GFR SERPL CREATININE-BSD FRML MDRD: >90 ML/MIN/1.73M2
GLUCOSE BLD-MCNC: 81 MG/DL (ref 70–125)
HDLC SERPL-MCNC: 43 MG/DL
LDLC SERPL CALC-MCNC: 176 MG/DL
POTASSIUM BLD-SCNC: 4.1 MMOL/L (ref 3.5–5)
PROT SERPL-MCNC: 7.8 G/DL (ref 6–8)
SODIUM SERPL-SCNC: 138 MMOL/L (ref 136–145)
TRIGL SERPL-MCNC: 317 MG/DL
TSH SERPL DL<=0.005 MIU/L-ACNC: 31.53 UIU/ML (ref 0.3–5)

## 2021-12-03 PROCEDURE — 99396 PREV VISIT EST AGE 40-64: CPT | Performed by: FAMILY MEDICINE

## 2021-12-03 PROCEDURE — 84439 ASSAY OF FREE THYROXINE: CPT | Performed by: FAMILY MEDICINE

## 2021-12-03 PROCEDURE — 80061 LIPID PANEL: CPT | Performed by: FAMILY MEDICINE

## 2021-12-03 PROCEDURE — 84443 ASSAY THYROID STIM HORMONE: CPT | Performed by: FAMILY MEDICINE

## 2021-12-03 PROCEDURE — 80053 COMPREHEN METABOLIC PANEL: CPT | Performed by: FAMILY MEDICINE

## 2021-12-03 PROCEDURE — 36415 COLL VENOUS BLD VENIPUNCTURE: CPT | Performed by: FAMILY MEDICINE

## 2021-12-03 PROCEDURE — 86803 HEPATITIS C AB TEST: CPT | Performed by: FAMILY MEDICINE

## 2021-12-03 RX ORDER — LOSARTAN POTASSIUM 50 MG/1
50 TABLET ORAL DAILY
Qty: 90 TABLET | Refills: 3 | Status: SHIPPED | OUTPATIENT
Start: 2021-12-03 | End: 2022-10-14

## 2021-12-03 RX ORDER — BUPROPION HYDROCHLORIDE 150 MG/1
150 TABLET ORAL DAILY
Qty: 90 TABLET | Refills: 3 | Status: SHIPPED | OUTPATIENT
Start: 2021-12-03 | End: 2022-10-14

## 2021-12-03 RX ORDER — ATORVASTATIN CALCIUM 40 MG/1
40 TABLET, FILM COATED ORAL DAILY
Qty: 90 TABLET | Refills: 3 | Status: SHIPPED | OUTPATIENT
Start: 2021-12-03 | End: 2023-01-04

## 2021-12-03 NOTE — LETTER
December 7, 2021      Choco LLOYD Vu  [address]   [place] [address]        Dear ,    We are writing to inform you of your test results.    I attempted to call you but your mailbox is full and it went straight to voicemail.    Your free T4 is normal but your TSH is high.  This normally means we need to increase your dose of levothyroxine.  However, sometimes it can mean that people are not taking the levothyroxine all by itself in the morning before eating or drinking anything by at least 30 minutes.  Would you be willing to reach back out to me and let me know how you are taking this medication?    In addition, your cholesterol is elevated and it is recommended that we start a medication for cholesterol.  I will call this in for you, but I usually like to discuss with patients before make any medication changes.  Please message me with a time that would be appropriate to call you.    The 10-year ASCVD risk score (Marguerite PALAFOX Jr., et al., 2013) is: 8.8%    Values used to calculate the score:      Age: 41 years      Sex: Male      Is Non- : No      Diabetic: No      Tobacco smoker: Yes      Systolic Blood Pressure: 112 mmHg      Is BP treated: Yes      HDL Cholesterol: 43 mg/dL      Total Cholesterol: 282 mg/dL      Resulted Orders   Lipid panel reflex to direct LDL Fasting   Result Value Ref Range    Cholesterol 282 (H) <=199 mg/dL    Triglycerides 317 (H) <=149 mg/dL    Direct Measure HDL 43 >=40 mg/dL      Comment:      HDL Cholesterol Reference Range:     0-2 years:   No reference ranges established for patients under 2 years old  at [facility] for lipid analytes.    2-8 years:  Greater than 45 mg/dL     18 years and older:   Female: Greater than or equal to 50 mg/dL   Male:   Greater than or equal to 40 mg/dL    LDL Cholesterol Calculated 176 (H) <=129 mg/dL    Patient Fasting > 8hrs? Unknown    Comprehensive metabolic panel (BMP + Alb, Alk Phos, ALT,  AST, Total. Bili, TP)   Result Value Ref Range    Sodium 138 136 - 145 mmol/L    Potassium 4.1 3.5 - 5.0 mmol/L    Chloride 103 98 - 107 mmol/L    Carbon Dioxide (CO2) 22 22 - 31 mmol/L    Anion Gap 13 5 - 18 mmol/L    Urea Nitrogen 13 8 - 22 mg/dL    Creatinine 0.94 0.70 - 1.30 mg/dL    Calcium 10.0 8.5 - 10.5 mg/dL    Glucose 81 70 - 125 mg/dL    Alkaline Phosphatase 81 45 - 120 U/L    AST 20 0 - 40 U/L    ALT 36 0 - 45 U/L    Protein Total 7.8 6.0 - 8.0 g/dL    Albumin 4.3 3.5 - 5.0 g/dL    Bilirubin Total 0.6 0.0 - 1.0 mg/dL    GFR Estimate >90 >60 mL/min/1.73m2      Comment:      As of 2021, eGFR is calculated by the CKD-EPI creatinine equation, without race adjustment. eGFR can be influenced by muscle mass, exercise, and diet. The reported eGFR is an estimation only and is only applicable if the renal function is stable.   Hepatitis C antibody   Result Value Ref Range    Hepatitis C Antibody Negative Negative    Narrative    Assay performance characteristics have not been established for newborns, infants, children (<18 years) or populations of immunocompromised or immunosuppressed patients.    TSH with free T4 reflex   Result Value Ref Range    TSH 31.53 (H) 0.30 - 5.00 uIU/mL   T4 free   Result Value Ref Range    Free T4 0.85 0.70 - 1.80 ng/dL      Comment:      Performance of the Free T4 test has not been established with  specimens (<= 2 months of age).         If you have any questions or concerns, please call the clinic at the number listed above.       Sincerely,      Ce Quiñonez MD

## 2021-12-03 NOTE — PROGRESS NOTES
Assessment/Plan:     Patient presents today for routine physical examination.    Healthcare Maintenance: USPSTF recommendations for age 41;  Patient has been counseled on/screened for:  - intimate partner violence and there are no concerns at this time  - a healthful diet and physical activity for CVD prevention  - Diabetes and hyperlipidemia: screening was performed.   Sexually transmitted infections: Patient would not like to be screened for chlamydia, gonorrhea, syphilis, HIV, and hepatitis  Immunizations: up to date except for covid which was recommended      Additional concerns are as detailed below:    1. Postoperative hypothyroidism  - TSH with free T4 reflex; Future    2. Essential hypertension  Well-controlled today.  Refilled medication.  - Comprehensive metabolic panel (BMP + Alb, Alk Phos, ALT, AST, Total. Bili, TP); Future  - losartan (COZAAR) 50 MG tablet; Take 1 tablet (50 mg) by mouth daily  Dispense: 90 tablet; Refill: 3    3. Mixed hyperlipidemia  - atorvastatin (LIPITOR) 40 MG tablet; Take 1 tablet (40 mg) by mouth daily  Dispense: 90 tablet; Refill: 3    4. Tobacco abuse  Trial again of Wellbutrin.  It worked previously with and patient's wife continue to smoke.  Patient would like her to also get on this medication.  - buPROPion (WELLBUTRIN XL) 150 MG 24 hr tablet; Take 1 tablet (150 mg) by mouth daily  Dispense: 90 tablet; Refill: 3    5. Need for hepatitis C screening test  - Hepatitis C antibody; Future    6. Routine health maintenance  - Lipid panel reflex to direct LDL Fasting; Future      AVS printed and given to patient.  Return to clinic in 1 year.     I have had an Advance Directives discussion with the patient.    This note has been dictated using voice recognition software. Any grammatical or context distortions are unintentional and inherent to the the software.     Ce Quiñonez MD  Family Medicine [facility]    Subjective:     Choco Vu is a 41 year old male who  presents to clinic for routine physical.    Additional concerns include:    1.  No concerns.     PHQ-2 Score:  0    Health Care Directive: discussed    Patient Care Team:   PCP: Ce Quiñonez     Past Medical History, Family History, and Social History reviewed.     Review of systems:  Patient endorses: none  The remainder of the 10 system review is otherwise negative.    Objective:     /76   Pulse 85   Wt 91.2 kg (201 lb)   SpO2 98%   BMI 33.97 kg/m    Gen: Alert, NAD, appears stated age, normal hygiene   Eyes: conjunctivae without injection, sclera clear, EOMI  ENT/mouth: nares clear, septum midline, absent rhinorrhea,absent pharyngeal injection, neck is supple, no thyroid enlargement  CV: RRR, no murmur appreciated, pedal edema absent bilaterally  Resp: CTAB, no wheezes, rales or ronchi  ABD: normoactive, non-tender to palpation, nondistended  MSK: grossly full range of motion in all joints, no obvious deformity  Neuro: CN II-XII grossly intact, no deficits in coordination  Psych: no apparent hallucinations or delusions, no pressured speech; alert, oriented x3  SKIN: dry and without lesions  Heme/lymph: no pallor, no active bleeding/bruising, no adenopathy appreciated    Medications:  Current Outpatient Medications   Medication     atorvastatin (LIPITOR) 40 MG tablet     buPROPion (WELLBUTRIN XL) 150 MG 24 hr tablet     levothyroxine (SYNTHROID/LEVOTHROID) 200 MCG tablet     losartan (COZAAR) 50 MG tablet     No current facility-administered medications for this visit.       Allergies:  No Known Allergies    PMH:  Past Medical History:   Diagnosis Date     Disease of thyroid gland      Hypertension        PSH:  Past Surgical History:   Procedure Laterality Date     APPENDECTOMY       TOTAL THYROIDECTOMY         Family Hx:  Family History   Problem Relation Age of Onset     Hypertension Mother      No Known Problems Father        Social History:  Social History     Socioeconomic History     Marital  status:      Spouse name: Not on file     Number of children: Not on file     Years of education: Not on file     Highest education level: Not on file   Occupational History     Not on file   Tobacco Use     Smoking status: Current Every Day Smoker     Packs/day: 1.00     Smokeless tobacco: Never Used   Substance and Sexual Activity     Alcohol use: Not on file     Comment: Alcoholic Drinks/day: 3 beers per month     Drug use: No     Sexual activity: Yes     Partners: Female   Other Topics Concern     Not on file   Social History Narrative     Not on file     Social Determinants of Health     Financial Resource Strain: Not on file   Food Insecurity: Not on file   Transportation Needs: Not on file   Physical Activity: Not on file   Stress: Not on file   Social Connections: Not on file   Intimate Partner Violence: Not on file   Housing Stability: Not on file       No components found for: LDLCALC     Immunization History   Administered Date(s) Administered     Anthrax 06/01/2007, 06/23/2007, 07/11/2007, 08/07/2007, 03/03/2008, 10/16/2008, 10/19/2009     FLU 6-35 months 12/13/2016     Flu, Unspecified 11/01/2012     HepA-Adult 10/16/2008, 04/05/2009     HepB-Adult 04/07/2006, 05/15/2006     Influenza Intranasal Vaccine 09/21/2006, 09/24/2007, 10/16/2008, 10/29/2010, 10/14/2011, 10/02/2012     Influenza Quad, Recombinant, pf(RIV4) (Flublok) 11/06/2021     Influenza Vaccine IM Ages 6-35 Months 4 Valent (PF) 04/07/2006, 10/19/2009     Influenza Vaccine, 6+MO IM (QUADRIVALENT W/PRESERVATIVES) 02/09/2018     Korean Encephalitis IM 11/05/2010, 02/08/2011     MMR 10/14/1992, 04/07/2006     Meningococcal (Menomune ) 04/07/2006     Pneumococcal 23 valent 04/27/2015, 08/24/2020     Pneumococcal, Unspecified 04/27/2015     Poliovirus, inactivated (IPV) 04/07/2006     Small Pox 09/13/2006     Td (Adult), Adsorbed 04/07/2006     Tdap (Adacel,Boostrix) 01/01/2011, 02/09/2018     Twinrix A/B 09/21/2006     Typhoid IM  09/21/2006, 10/16/2008, 11/05/2010, 10/02/2012

## 2021-12-04 LAB — T4 FREE SERPL-MCNC: 0.85 NG/DL (ref 0.7–1.8)

## 2021-12-06 LAB — HCV AB SERPL QL IA: NEGATIVE

## 2021-12-07 ENCOUNTER — TELEPHONE (OUTPATIENT)
Dept: FAMILY MEDICINE | Facility: CLINIC | Age: 41
End: 2021-12-07
Payer: COMMERCIAL

## 2021-12-07 RX ORDER — ATORVASTATIN CALCIUM 40 MG/1
40 TABLET, FILM COATED ORAL DAILY
Qty: 90 TABLET | Refills: 3 | Status: SHIPPED | OUTPATIENT
Start: 2021-12-07 | End: 2022-10-14

## 2021-12-20 DIAGNOSIS — E89.0 POSTOPERATIVE HYPOTHYROIDISM: ICD-10-CM

## 2021-12-20 RX ORDER — LEVOTHYROXINE SODIUM 200 UG/1
TABLET ORAL
Qty: 30 TABLET | Refills: 0 | Status: SHIPPED | OUTPATIENT
Start: 2021-12-20 | End: 2022-02-08

## 2022-02-08 ENCOUNTER — MYC REFILL (OUTPATIENT)
Dept: FAMILY MEDICINE | Facility: CLINIC | Age: 42
End: 2022-02-08
Payer: COMMERCIAL

## 2022-02-08 DIAGNOSIS — E89.0 POSTOPERATIVE HYPOTHYROIDISM: ICD-10-CM

## 2022-02-09 NOTE — TELEPHONE ENCOUNTER
"Routing refill request to provider for review/approval because:  Labs out of range:  TSH 31.53    Last Written Prescription Date:  12/20/2021  Last Fill Quantity: 30,  # refills: 0   Last office visit provider:  12/3/2021     Requested Prescriptions   Pending Prescriptions Disp Refills     levothyroxine (SYNTHROID/LEVOTHROID) 200 MCG tablet 30 tablet 0     Sig: [LEVOTHYROXINE (SYNTHROID, LEVOTHROID) 200 MCG TABLET] Take 1 tablet by mouth once daily.  **PLEASE CONTACT DR. JAVIER'S OFFICE**       Thyroid Protocol Failed - 2/8/2022  4:36 AM        Failed - Normal TSH on file in past 12 months     Recent Labs   Lab Test 12/03/21  1445   TSH 31.53*              Passed - Patient is 12 years or older        Passed - Recent (12 mo) or future (30 days) visit within the authorizing provider's specialty     Patient has had an office visit with the authorizing provider or a provider within the authorizing providers department within the previous 12 mos or has a future within next 30 days. See \"Patient Info\" tab in inbasket, or \"Choose Columns\" in Meds & Orders section of the refill encounter.              Passed - Medication is active on med list             Francoise Shepherd RN 02/09/22 1:59 PM  "

## 2022-02-10 ENCOUNTER — MYC REFILL (OUTPATIENT)
Dept: FAMILY MEDICINE | Facility: CLINIC | Age: 42
End: 2022-02-10
Payer: COMMERCIAL

## 2022-02-10 DIAGNOSIS — E89.0 POSTOPERATIVE HYPOTHYROIDISM: ICD-10-CM

## 2022-02-10 RX ORDER — LEVOTHYROXINE SODIUM 200 UG/1
TABLET ORAL
Qty: 30 TABLET | Refills: 0 | Status: SHIPPED | OUTPATIENT
Start: 2022-02-10 | End: 2022-03-23

## 2022-02-10 NOTE — TELEPHONE ENCOUNTER
Spoke with pt and he will stop by the hospital one night after work. Dr Quiñonez will you please put order in for his labs?

## 2022-02-14 RX ORDER — LEVOTHYROXINE SODIUM 200 UG/1
TABLET ORAL
Qty: 30 TABLET | Refills: 0 | OUTPATIENT
Start: 2022-02-14

## 2022-03-05 ENCOUNTER — LAB (OUTPATIENT)
Dept: LAB | Facility: HOSPITAL | Age: 42
End: 2022-03-05
Payer: COMMERCIAL

## 2022-03-05 DIAGNOSIS — E89.0 POSTOPERATIVE HYPOTHYROIDISM: ICD-10-CM

## 2022-03-05 LAB — TSH SERPL DL<=0.005 MIU/L-ACNC: 1.44 UIU/ML (ref 0.3–5)

## 2022-03-05 PROCEDURE — 36415 COLL VENOUS BLD VENIPUNCTURE: CPT

## 2022-03-05 PROCEDURE — 84443 ASSAY THYROID STIM HORMONE: CPT

## 2022-03-23 ENCOUNTER — MYC REFILL (OUTPATIENT)
Dept: FAMILY MEDICINE | Facility: CLINIC | Age: 42
End: 2022-03-23
Payer: COMMERCIAL

## 2022-03-23 DIAGNOSIS — E89.0 POSTOPERATIVE HYPOTHYROIDISM: ICD-10-CM

## 2022-03-24 NOTE — TELEPHONE ENCOUNTER
"Routing refill request to provider for review/approval because:  Please clarify if SIG still applicable for the patient to contact provider.    Last Written Prescription Date:  2/10/22  Last Fill Quantity: 30,  # refills: 0   Last office visit provider:  12/3/21     Requested Prescriptions   Pending Prescriptions Disp Refills     levothyroxine (SYNTHROID/LEVOTHROID) 200 MCG tablet 30 tablet 0     Sig: [LEVOTHYROXINE (SYNTHROID, LEVOTHROID) 200 MCG TABLET] Take 1 tablet by mouth once daily.  **PLEASE CONTACT DR. JAVIER'S OFFICE**       Thyroid Protocol Passed - 3/24/2022 11:24 AM        Passed - Patient is 12 years or older        Passed - Recent (12 mo) or future (30 days) visit within the authorizing provider's specialty     Patient has had an office visit with the authorizing provider or a provider within the authorizing providers department within the previous 12 mos or has a future within next 30 days. See \"Patient Info\" tab in inbasket, or \"Choose Columns\" in Meds & Orders section of the refill encounter.              Passed - Medication is active on med list        Passed - Normal TSH on file in past 12 months     Recent Labs   Lab Test 03/05/22  0905   TSH 1.44                   Dell Brown RN 03/24/22 11:25 AM  "

## 2022-03-25 RX ORDER — LEVOTHYROXINE SODIUM 200 UG/1
TABLET ORAL
Qty: 30 TABLET | Refills: 0 | Status: SHIPPED | OUTPATIENT
Start: 2022-03-25 | End: 2022-05-09

## 2022-05-09 ENCOUNTER — MYC REFILL (OUTPATIENT)
Dept: FAMILY MEDICINE | Facility: CLINIC | Age: 42
End: 2022-05-09
Payer: COMMERCIAL

## 2022-05-09 DIAGNOSIS — E89.0 POSTOPERATIVE HYPOTHYROIDISM: ICD-10-CM

## 2022-05-11 RX ORDER — LEVOTHYROXINE SODIUM 200 UG/1
TABLET ORAL
Qty: 90 TABLET | Refills: 3 | Status: SHIPPED | OUTPATIENT
Start: 2022-05-11 | End: 2022-08-23

## 2022-05-11 NOTE — TELEPHONE ENCOUNTER
"Last Written Prescription Date:  3/25/22  Last Fill Quantity: 30,  # refills: 0   Last office visit provider:  12/3/21     Requested Prescriptions   Pending Prescriptions Disp Refills     levothyroxine (SYNTHROID/LEVOTHROID) 200 MCG tablet 30 tablet 0     Sig: [LEVOTHYROXINE (SYNTHROID, LEVOTHROID) 200 MCG TABLET] Take 1 tablet by mouth once daily.  **PLEASE CONTACT DR. JAVIER'S OFFICE**       Thyroid Protocol Passed - 5/9/2022 10:15 PM        Passed - Patient is 12 years or older        Passed - Recent (12 mo) or future (30 days) visit within the authorizing provider's specialty     Patient has had an office visit with the authorizing provider or a provider within the authorizing providers department within the previous 12 mos or has a future within next 30 days. See \"Patient Info\" tab in inbasket, or \"Choose Columns\" in Meds & Orders section of the refill encounter.              Passed - Medication is active on med list        Passed - Normal TSH on file in past 12 months     Recent Labs   Lab Test 03/05/22  0905   TSH 1.44                   Dee Medeiros, RN 05/11/22 2:05 PM    "

## 2022-08-22 ENCOUNTER — TELEPHONE (OUTPATIENT)
Dept: FAMILY MEDICINE | Facility: CLINIC | Age: 42
End: 2022-08-22

## 2022-08-22 DIAGNOSIS — E89.0 POSTOPERATIVE HYPOTHYROIDISM: ICD-10-CM

## 2022-08-22 NOTE — TELEPHONE ENCOUNTER
Reason for Call:  Medication or medication refill:    Do you use a [facility]?  Name of the pharmacy and phone number for the current request:  [facility] [address] [place] [address]    Name of the medication requested: levothyroxine (SYNTHROID/LEVOTHROID) 200 MCG tablet    Other request: Patient will be out in about one week. Patient establishing new doctor as old PCP is leaving (letter sent to patient.)     Can we leave a detailed message on this number? YES    Phone number patient can be reached at: Cell number on file:    Telephone Information:   Mobile [phone]       Best Time: Anytime     Call taken on 8/22/2022 at 4:53 PM by Edie Oro

## 2022-08-23 RX ORDER — LEVOTHYROXINE SODIUM 200 UG/1
TABLET ORAL
Qty: 90 TABLET | Refills: 0 | Status: SHIPPED | OUTPATIENT
Start: 2022-08-23 | End: 2022-10-14

## 2022-10-01 ENCOUNTER — HEALTH MAINTENANCE LETTER (OUTPATIENT)
Age: 42
End: 2022-10-01

## 2022-10-11 ENCOUNTER — OFFICE VISIT (OUTPATIENT)
Dept: FAMILY MEDICINE | Facility: CLINIC | Age: 42
End: 2022-10-11
Payer: COMMERCIAL

## 2022-10-11 VITALS
OXYGEN SATURATION: 95 % | SYSTOLIC BLOOD PRESSURE: 123 MMHG | WEIGHT: 195 LBS | HEART RATE: 65 BPM | BODY MASS INDEX: 32.95 KG/M2 | DIASTOLIC BLOOD PRESSURE: 78 MMHG

## 2022-10-11 DIAGNOSIS — I10 ESSENTIAL HYPERTENSION: ICD-10-CM

## 2022-10-11 DIAGNOSIS — C73 MALIGNANT NEOPLASM OF THYROID GLAND (H): ICD-10-CM

## 2022-10-11 DIAGNOSIS — Z00.00 VISIT FOR PREVENTIVE HEALTH EXAMINATION: Primary | ICD-10-CM

## 2022-10-11 DIAGNOSIS — E05.00 GRAVES' DISEASE: ICD-10-CM

## 2022-10-11 DIAGNOSIS — R06.83 SNORING: ICD-10-CM

## 2022-10-11 DIAGNOSIS — E89.0 POSTOPERATIVE HYPOTHYROIDISM: ICD-10-CM

## 2022-10-11 DIAGNOSIS — Z72.0 TOBACCO ABUSE: ICD-10-CM

## 2022-10-11 DIAGNOSIS — E78.2 MIXED HYPERLIPIDEMIA: ICD-10-CM

## 2022-10-11 DIAGNOSIS — R06.81 WITNESSED APNEIC SPELLS: ICD-10-CM

## 2022-10-11 LAB
ANION GAP SERPL CALCULATED.3IONS-SCNC: 10 MMOL/L (ref 7–15)
BUN SERPL-MCNC: 13.4 MG/DL (ref 6–20)
CALCIUM SERPL-MCNC: 9.9 MG/DL (ref 8.6–10)
CHLORIDE SERPL-SCNC: 103 MMOL/L (ref 98–107)
CHOLEST SERPL-MCNC: 165 MG/DL
CREAT SERPL-MCNC: 0.94 MG/DL (ref 0.67–1.17)
DEPRECATED HCO3 PLAS-SCNC: 24 MMOL/L (ref 22–29)
GFR SERPL CREATININE-BSD FRML MDRD: >90 ML/MIN/1.73M2
GLUCOSE SERPL-MCNC: 88 MG/DL (ref 70–99)
HDLC SERPL-MCNC: 37 MG/DL
LDLC SERPL CALC-MCNC: 101 MG/DL
NONHDLC SERPL-MCNC: 128 MG/DL
POTASSIUM SERPL-SCNC: 4.4 MMOL/L (ref 3.4–5.3)
SODIUM SERPL-SCNC: 137 MMOL/L (ref 136–145)
T4 FREE SERPL-MCNC: 1.41 NG/DL (ref 0.9–1.7)
TRIGL SERPL-MCNC: 134 MG/DL
TSH SERPL DL<=0.005 MIU/L-ACNC: 9.26 UIU/ML (ref 0.3–4.2)

## 2022-10-11 PROCEDURE — 99214 OFFICE O/P EST MOD 30 MIN: CPT | Mod: 25 | Performed by: NURSE PRACTITIONER

## 2022-10-11 PROCEDURE — 36415 COLL VENOUS BLD VENIPUNCTURE: CPT | Performed by: NURSE PRACTITIONER

## 2022-10-11 PROCEDURE — 84443 ASSAY THYROID STIM HORMONE: CPT | Performed by: NURSE PRACTITIONER

## 2022-10-11 PROCEDURE — 80061 LIPID PANEL: CPT | Performed by: NURSE PRACTITIONER

## 2022-10-11 PROCEDURE — 90471 IMMUNIZATION ADMIN: CPT | Performed by: NURSE PRACTITIONER

## 2022-10-11 PROCEDURE — 80048 BASIC METABOLIC PNL TOTAL CA: CPT | Performed by: NURSE PRACTITIONER

## 2022-10-11 PROCEDURE — 84439 ASSAY OF FREE THYROXINE: CPT | Performed by: NURSE PRACTITIONER

## 2022-10-11 PROCEDURE — 90686 IIV4 VACC NO PRSV 0.5 ML IM: CPT | Performed by: NURSE PRACTITIONER

## 2022-10-11 RX ORDER — LEVOTHYROXINE SODIUM 200 UG/1
TABLET ORAL
Qty: 90 TABLET | Refills: 0 | Status: CANCELLED | OUTPATIENT
Start: 2022-10-11

## 2022-10-11 RX ORDER — NICOTINE 21 MG/24HR
1 PATCH, TRANSDERMAL 24 HOURS TRANSDERMAL EVERY 24 HOURS
Qty: 42 PATCH | Refills: 1 | Status: SHIPPED | OUTPATIENT
Start: 2022-10-11 | End: 2022-11-22

## 2022-10-11 RX ORDER — NICOTINE 21 MG/24HR
1 PATCH, TRANSDERMAL 24 HOURS TRANSDERMAL EVERY 24 HOURS
Qty: 14 PATCH | Refills: 1 | Status: SHIPPED | OUTPATIENT
Start: 2022-11-22 | End: 2022-12-06

## 2022-10-11 NOTE — PROGRESS NOTES
Assessment & Plan     Postoperative hypothyroidism    - TSH with free T4 reflex  - TSH with free T4 reflex  - T4 free  - Adult Endocrinology  Referral    Witnessed apneic spells    - Adult Sleep Eval & Management  Referral    Mixed hyperlipidemia    - Lipid Profile (Chol, Trig, HDL, LDL calc)  - Lipid Profile (Chol, Trig, HDL, LDL calc)  - Lipitor reflled    Essential hypertension    - Basic metabolic panel  - Adult Sleep Eval & Management  Referral  - Basic metabolic panel  - Cozaar refilled     Snoring    - Adult Sleep Eval & Management  Referral    Graves' disease    - TSH with free T4 reflex  - Adult Endocrinology  Referral    Malignant neoplasm of thyroid gland (H)    - TSH with free T4 reflex  - Adult Endocrinology  Referral  - Synthroid    Tobacco abuse    - nicotine (NICODERM CQ) 21 MG/24HR 24 hr patch  Dispense: 42 patch; Refill: 1  - nicotine (NICODERM CQ) 14 MG/24HR 24 hr patch  Dispense: 14 patch; Refill: 1  - nicotine (NICODERM CQ) 7 MG/24HR 24 hr patch  Dispense: 14 patch; Refill: 1    -He can continue current medications.  Blood pressure, BMP, and lipids are stable.   -Thyroid level be checked and within hypothyroid range. Has been on the same dose for many years. He is taking medication with his other medications. Has been doing this for sometime. I will refill med at current dose and discussed that he should take medications at different times and to discussed this further with pharmacist at [facility].   - [facility] I can not find any detailed [facility]. I will have him follow up with Endo for further evaluation. May need additional testing. I will have them retest TSH at that time as well. He should be able to get in there within 2-3 months.   -Smoking cessation discussed and he is willing to try nicotine patches.   -Witnessed apnea plus snoring.  Sleep study has been ordered and patient is willing to wear CPAP if needed.   -I  "reviewed prior labs with patient today.              Nicotine/Tobacco Cessation:  He reports that he has been smoking cigarettes. He has been smoking an average of 1 pack per day. He has never used smokeless tobacco.  Nicotine/Tobacco Cessation Plan:   Pharmacotherapies : Nicotine patch          Return in about 1 year (around 10/11/2023) for Routine preventive, with me.    VERO Duarte CNP  [facility]INOhio[facility] Grant [facility] Center[facility]ARCIA Wilhelm is a 42 year old accompanied by his spouse, presenting for the following health issues:  Establish Care (Needs a PCP)      Patient is present with wife.  He stated and [facility], he has a history of graves disease.  He stated today that [facility] told [facility] they found \"alitte ball of cancer\" but it is nothing too concerning. 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 He had a total thyroidectomy I believe in 2012. He has been on Synthroid since this time and has been on 200 mcg of levothyroxine for quite some time. He is taking it daily with his other medications. He has a hard time  the dose.  He has been monitoring his thyroid levels every year. Denied any other symptoms of hypothyroid except he feels tired during the day.  He stated that he is snoring at night and his wife stated that she is noticing witnessed apnea periods.  The patient stated that he does feel well rested when he wakes up but does tend to feel tired as the day continues.  He does smoke cigarettes and is willing to quit. They are working on trying to have a child.     History of Present Illness       Reason for visit:  Establish primary care provider    He eats 0-1 servings of fruits and vegetables daily.He consumes 2 sweetened beverage(s) daily.He exercises with enough effort to increase his heart rate 9 or less minutes per day.  He exercises with enough effort to increase his heart rate 3 or less days per week. " He is missing 1 dose(s) of medications per week.  He is not taking prescribed medications regularly due to remembering to take.       Review of Systems   Constitutional: Positive for fatigue. Negative for chills, fever and unexpected weight change.   HENT: Negative.    Eyes: Negative.    Respiratory: Negative.    Cardiovascular: Negative.    Gastrointestinal: Negative.    Endocrine: Negative for cold intolerance, heat intolerance, polydipsia, polyphagia and polyuria.   Musculoskeletal: Negative.    Neurological: Negative.    Psychiatric/Behavioral: Negative.           Objective    /78   Pulse 65   Wt 88.5 kg (195 lb)   SpO2 95%   BMI 32.95 kg/m    Body mass index is 32.95 kg/m .  Physical Exam  Vitals and nursing note reviewed.   Constitutional:       Appearance: Normal appearance. He is normal weight.   HENT:      Mouth/Throat:      Mouth: Mucous membranes are moist.   Cardiovascular:      Rate and Rhythm: Normal rate and regular rhythm.      Pulses: Normal pulses.   Pulmonary:      Effort: Pulmonary effort is normal.      Breath sounds: Normal breath sounds.   Abdominal:      General: Abdomen is flat.      Palpations: Abdomen is soft.   Musculoskeletal:      Cervical back: Normal range of motion. No rigidity or tenderness.      Right lower leg: No edema.      Left lower leg: No edema.   Lymphadenopathy:      Cervical: No cervical adenopathy.   Skin:     General: Skin is warm and dry.      Capillary Refill: Capillary refill takes less than 2 seconds.   Neurological:      General: No focal deficit present.      Mental Status: He is alert.   Psychiatric:         Mood and Affect: Mood normal.         Behavior: Behavior normal.         Thought Content: Thought content normal.         Judgment: Judgment normal.            Results for orders placed or performed in visit on 10/11/22   Lipid Profile (Chol, Trig, HDL, LDL calc)     Status: Abnormal   Result Value Ref Range    Cholesterol 165 <200 mg/dL     Triglycerides 134 <150 mg/dL    Direct Measure HDL 37 (L) >=40 mg/dL    LDL Cholesterol Calculated 101 (H) <=100 mg/dL    Non HDL Cholesterol 128 <130 mg/dL    Narrative    Cholesterol  Desirable:  <200 mg/dL    Triglycerides  Normal:  Less than 150 mg/dL  Borderline High:  150-199 mg/dL  High:  200-499 mg/dL  Very High:  Greater than or equal to 500 mg/dL    Direct Measure HDL  Female:  Greater than or equal to 50 mg/dL   Male:  Greater than or equal to 40 mg/dL    LDL Cholesterol  Desirable:  <100mg/dL  Above Desirable:  100-129 mg/dL   Borderline High:  130-159 mg/dL   High:  160-189 mg/dL   Very High:  >= 190 mg/dL    Non HDL Cholesterol  Desirable:  130 mg/dL  Above Desirable:  130-159 mg/dL  Borderline High:  160-189 mg/dL  High:  190-219 mg/dL  Very High:  Greater than or equal to 220 mg/dL   Basic metabolic panel     Status: Normal   Result Value Ref Range    Sodium 137 136 - 145 mmol/L    Potassium 4.4 3.4 - 5.3 mmol/L    Chloride 103 98 - 107 mmol/L    Carbon Dioxide (CO2) 24 22 - 29 mmol/L    Anion Gap 10 7 - 15 mmol/L    Urea Nitrogen 13.4 6.0 - 20.0 mg/dL    Creatinine 0.94 0.67 - 1.17 mg/dL    Calcium 9.9 8.6 - 10.0 mg/dL    Glucose 88 70 - 99 mg/dL    GFR Estimate >90 >60 mL/min/1.73m2   TSH with free T4 reflex     Status: Abnormal   Result Value Ref Range    TSH 9.26 (H) 0.30 - 4.20 uIU/mL   T4 free     Status: Normal   Result Value Ref Range    Free T4 1.41 0.90 - 1.70 ng/dL

## 2022-10-14 RX ORDER — LOSARTAN POTASSIUM 50 MG/1
50 TABLET ORAL DAILY
Qty: 90 TABLET | Refills: 4 | Status: SHIPPED | OUTPATIENT
Start: 2022-10-14 | End: 2024-01-23

## 2022-10-14 RX ORDER — LEVOTHYROXINE SODIUM 200 UG/1
TABLET ORAL
Qty: 90 TABLET | Refills: 0 | Status: SHIPPED | OUTPATIENT
Start: 2022-10-14 | End: 2023-01-12

## 2022-10-14 RX ORDER — ATORVASTATIN CALCIUM 40 MG/1
40 TABLET, FILM COATED ORAL DAILY
Qty: 90 TABLET | Refills: 4 | Status: SHIPPED | OUTPATIENT
Start: 2022-10-14 | End: 2024-01-23

## 2022-10-14 ASSESSMENT — ENCOUNTER SYMPTOMS
RESPIRATORY NEGATIVE: 1
EYES NEGATIVE: 1
GASTROINTESTINAL NEGATIVE: 1
PSYCHIATRIC NEGATIVE: 1
FATIGUE: 1
UNEXPECTED WEIGHT CHANGE: 0
CHILLS: 0
MUSCULOSKELETAL NEGATIVE: 1
NEUROLOGICAL NEGATIVE: 1
POLYDIPSIA: 0
CARDIOVASCULAR NEGATIVE: 1
POLYPHAGIA: 0
FEVER: 0

## 2023-01-04 ENCOUNTER — OFFICE VISIT (OUTPATIENT)
Dept: ENDOCRINOLOGY | Facility: CLINIC | Age: 43
End: 2023-01-04
Attending: NURSE PRACTITIONER
Payer: COMMERCIAL

## 2023-01-04 ENCOUNTER — LAB (OUTPATIENT)
Dept: LAB | Facility: CLINIC | Age: 43
End: 2023-01-04
Payer: COMMERCIAL

## 2023-01-04 VITALS
SYSTOLIC BLOOD PRESSURE: 120 MMHG | DIASTOLIC BLOOD PRESSURE: 84 MMHG | BODY MASS INDEX: 32.45 KG/M2 | WEIGHT: 192 LBS | HEART RATE: 88 BPM

## 2023-01-04 DIAGNOSIS — C73 MALIGNANT NEOPLASM OF THYROID GLAND (H): ICD-10-CM

## 2023-01-04 DIAGNOSIS — E05.00 GRAVES' DISEASE: ICD-10-CM

## 2023-01-04 DIAGNOSIS — E89.0 POSTOPERATIVE HYPOTHYROIDISM: ICD-10-CM

## 2023-01-04 DIAGNOSIS — E89.0 POSTOPERATIVE HYPOTHYROIDISM: Primary | ICD-10-CM

## 2023-01-04 LAB — TSH SERPL DL<=0.005 MIU/L-ACNC: 0.72 UIU/ML (ref 0.3–4.2)

## 2023-01-04 PROCEDURE — 86800 THYROGLOBULIN ANTIBODY: CPT | Mod: 90

## 2023-01-04 PROCEDURE — 99204 OFFICE O/P NEW MOD 45 MIN: CPT | Performed by: INTERNAL MEDICINE

## 2023-01-04 PROCEDURE — 99000 SPECIMEN HANDLING OFFICE-LAB: CPT

## 2023-01-04 PROCEDURE — 84443 ASSAY THYROID STIM HORMONE: CPT

## 2023-01-04 PROCEDURE — 84432 ASSAY OF THYROGLOBULIN: CPT | Mod: 90

## 2023-01-04 PROCEDURE — 36415 COLL VENOUS BLD VENIPUNCTURE: CPT

## 2023-01-04 NOTE — PATIENT INSTRUCTIONS
-Labs today  -For now, continue levothyroxine 200 mcg daily, separate from other medications and coffee/breakfast as already taking; please be mindful of any changes in levothyroxine pill color or shape (this might indicate a change in generic manufacture)  -Based on results, we will decide whether to adjust levothyroxine dose  -We will await records from endocrinology in [place]  -Return for a follow-up visit in 3 months  -We will communicate results by phone for initial thyroid function tests; if additional labs are normal, we will send letter

## 2023-01-04 NOTE — LETTER
"    1/4/2023         RE: Choco Vu  [address] [place] [address]        Dear Colleague,    Thank you for referring your patient, Choco Vu, to the [facility]. Please see a copy of my visit note below.      ENDOCRINOLOGY NEW PATIENT VISIT        HISTORY OF PRESENT ILLNESS    Choco Vu is seen in consultation at the request of VERO Duarte CNP for hypothyroidism and possible history of thyroid cancer.    I do not have [facility] detailed [facility].  However a Care Everywhere diagnosis summary indicates \"papillary microcarcinoma.\"  The patient himself states that he does not recall a diagnosis of thyroid cancer.  He recalls having radioiodine scan for diagnosis of Graves' disease but does not recall radioiodine therapy after thyroidectomy, nor does he recall having neck ultrasounds.    He recalls that he initially presented with eye symptoms concerning for Graves' ophthalmopathy.  He was found to be hyperthyroid and after discussion of options he underwent thyroidectomy.  Surgery was performed in [place] (he was stationed in [facility] and the [employer] flew him to [place] for endocrine care).    We have requested past records which we are awaiting.  We have received 1 faxed record of operative note from thyroidectomy performed on 8/1/2012.  This note indicates that patient had history of Graves' disease and preferred surgical treatment of Graves' hyperthyroidism.  Total thyroidectomy was performed and based on review of operative note, it does not appear that there were unusual/remarkable findings.    The patient notes that he has since been on levothyroxine, although he finds that the brand or formulation of levothyroxine may change depending through his pharmacy.  He recalls that when he had his last set of thyroid function tests checked in 10/2022, his brand of levothyroxine had changed to Euthyrox.    When he was last seen in primary care clinic, it was " advised that he take levothyroxine separate from other medications or food/beverages.  He has made this change and now takes levothyroxine first thing in the morning with water and waits about 45 minutes before having other beverages or breakfast.  He typically takes his other medications in the evening.  He misses levothyroxine dose occasionally, more likely to happen on weekends when he is not following his usual work schedule.    He has some fatigue.  More variable temperature tolerance since thyroidectomy, both heat and cold intolerance.  Also endorses loose stools.  He has gained approximately 30 pounds since thyroidectomy in 2012.    He wears glasses to improve visual acuity.  Otherwise, no diplopia, conjunctival injection, tearing or dry eyes.    He has not noted any neck masses or lumps.  His voice sometimes grows raspy after singing but otherwise has no dysphagia or consistent hoarseness.    He does not have history of excessive head or neck radiation exposure.  Mother has hypothyroidism.  No family members with thyroid cancer.    Pertinent Social History: , no children; works in [employer].  Previously served in the [employer].    PAST MEDICAL HISTORY  Past Medical History:   Diagnosis Date     Disease of thyroid gland      Hypertension        MEDICATIONS  Current Outpatient Medications   Medication Sig Dispense Refill     atorvastatin (LIPITOR) 40 MG tablet Take 1 tablet (40 mg) by mouth daily 90 tablet 4     levothyroxine (SYNTHROID/LEVOTHROID) 200 MCG tablet [LEVOTHYROXINE (SYNTHROID, LEVOTHROID) 200 MCG TABLET] Take 1 tablet by mouth once daily. 90 tablet 0     losartan (COZAAR) 50 MG tablet Take 1 tablet (50 mg) by mouth daily 90 tablet 4       Allergies, family, and social history were reviewed and documented as needed in EHR.     REVIEW OF SYSTEMS  A complete 10-point ROS was performed and pertinent positives and negatives are noted in the HPI.     PHYSICAL EXAM  /84 (BP Location: Left arm,  Patient Position: Sitting, Cuff Size: Adult Large)   Pulse 88   Wt 87.1 kg (192 lb)   BMI 32.45 kg/m    Body mass index is 32.45 kg/m .  Constitutional: Vital signs reviewed, as recorded above. Patient is alert, oriented and appears in no acute distress.  Eyes: PER, EOMI, mild proptosis but no stare, lid lag, or retraction; no conjunctival injection.  Neck: Neck supple, no palpable thyroid tissue, well-healed surgical incision site.  Lymphatic: No cervical or supraclavicular LAD.  Cardiovascular: RRR, normal S1/S2, no audible murmurs, rubs or gallops; no LE edema.  Respiratory: CTAB, without wheezes, crackles or rhonchi; normal chest wall motion and respiratory effort.  MSK: No clubbing or cyanosis; normal muscle bulk and tone.  Skin: Normal skin color, temperature, turgor and texture, no purple striae.  Neurological: Alert and oriented times 3. No tremor.    DATA REVIEW  Each of the following laboratory and/or imaging studies were reviewed.    Office Visit on 10/11/2022   Component Date Value Ref Range Status     Cholesterol 10/11/2022 165  <200 mg/dL Final     Triglycerides 10/11/2022 134  <150 mg/dL Final     Direct Measure HDL 10/11/2022 37 (L)  >=40 mg/dL Final     LDL Cholesterol Calculated 10/11/2022 101 (H)  <=100 mg/dL Final     Non HDL Cholesterol 10/11/2022 128  <130 mg/dL Final     Sodium 10/11/2022 137  136 - 145 mmol/L Final     Potassium 10/11/2022 4.4  3.4 - 5.3 mmol/L Final     Chloride 10/11/2022 103  98 - 107 mmol/L Final     Carbon Dioxide (CO2) 10/11/2022 24  22 - 29 mmol/L Final     Anion Gap 10/11/2022 10  7 - 15 mmol/L Final     Urea Nitrogen 10/11/2022 13.4  6.0 - 20.0 mg/dL Final     Creatinine 10/11/2022 0.94  0.67 - 1.17 mg/dL Final     Calcium 10/11/2022 9.9  8.6 - 10.0 mg/dL Final     Glucose 10/11/2022 88  70 - 99 mg/dL Final     GFR Estimate 10/11/2022 >90  >60 mL/min/1.73m2 Final    Effective December 21, 2021 eGFRcr in adults is calculated using the 2021 CKD-EPI creatinine  "equation which includes age and gender (Jean-Paul aguirre al., [place], DOI: 10.1056/ZTSDao3423473)     TSH 10/11/2022 9.26 (H)  0.30 - 4.20 uIU/mL Final     Free T4 10/11/2022 1.41  0.90 - 1.70 ng/dL Final         ASSESSMENT  1.  Hypothyroidism.  Postsurgical, with history of Graves' disease.  Labs drawn in 10/2022 showed under replacement with levothyroxine, however at that time he had been taking levothyroxine with other medications.  Has since been  levothyroxine from other medications and food.  We will therefore recheck thyroid function tests and adjust levothyroxine as needed.  He occasionally misses doses and we discussed the ability to \"catch up\" with levothyroxine the next day if he finds that he missed a dose the day prior.    2.  Question of thyroid cancer.  Primary care notes have indicated prior records of thyroid cancer, although the patient does not recall a diagnosis of thyroid cancer, postsurgical radioiodine therapy or follow-up imaging studies after thyroidectomy.  We will check thyroglobulin panel since we are drawing labs today.  Otherwise, I will await outside records before determining if any additional testing/treatment would be indicated.    PLAN  -Labs today  -For now, continue levothyroxine 200 mcg daily, separate from other medications and coffee/breakfast as already taking; please be mindful of any changes in levothyroxine pill color or shape (this might indicate a change in generic manufacture)  -Based on results, we will decide whether to adjust levothyroxine dose  -We will await records from endocrinology in [place]  -Return for a follow-up visit in 3 months  -We will communicate results by phone for initial thyroid function tests; if additional labs are normal, we will send letter      Orders Placed This Encounter   Procedures     TSH with free T4 reflex     Thyroglobulin and Antibody (Sendout to [facility])         I spent a total of 51 minutes on the date of encounter reviewing medical " records, evaluating the patient, coordinating care and documenting in the EHR, as detailed above.      Lito Nuñez MD   Division of Diabetes, Endocrinology and Metabolism  Department of Medicine      cc: VERO Duarte CNP             Again, thank you for allowing me to participate in the care of your patient.        Sincerely,        LITO Nuñez MD

## 2023-01-04 NOTE — PROGRESS NOTES
"  ENDOCRINOLOGY NEW PATIENT VISIT        HISTORY OF PRESENT ILLNESS    Choco Vu is seen in consultation at the request of VERO Duarte CNP for hypothyroidism and possible history of thyroid cancer.    I do not have access [facility] detailed [facility].  However a Care Everywhere diagnosis summary indicates \"papillary microcarcinoma.\"  The patient himself states that he does not recall a diagnosis of thyroid cancer.  He recalls having radioiodine scan for diagnosis of Graves' disease but does not recall radioiodine therapy after thyroidectomy, nor does he recall having neck ultrasounds.    He recalls that he initially presented with eye symptoms concerning for Graves' ophthalmopathy.  He was found to be hyperthyroid and after discussion of options he underwent thyroidectomy.  Surgery was performed in [place] (he was stationed in [facility] and the [employer] flew him to [place] for endocrine care).    We have requested past records which we are awaiting.  We have received 1 faxed record of operative note from thyroidectomy performed on 8/1/2012.  This note indicates that patient had history of Graves' disease and preferred surgical treatment of Graves' hyperthyroidism.  Total thyroidectomy was performed and based on review of operative note, it does not appear that there were unusual/remarkable findings.    The patient notes that he has since been on levothyroxine, although he finds that the brand or formulation of levothyroxine may change depending through his pharmacy.  He recalls that when he had his last set of thyroid function tests checked in 10/2022, his brand of levothyroxine had changed to Euthyrox.    When he was last seen in primary care clinic, it was advised that he take levothyroxine separate from other medications or food/beverages.  He has made this change and now takes levothyroxine first thing in the morning with water and waits about 45 minutes before having other beverages or breakfast.  He " typically takes his other medications in the evening.  He misses levothyroxine dose occasionally, more likely to happen on weekends when he is not following his usual work schedule.    He has some fatigue.  More variable temperature tolerance since thyroidectomy, both heat and cold intolerance.  Also endorses loose stools.  He has gained approximately 30 pounds since thyroidectomy in 2012.    He wears glasses to improve visual acuity.  Otherwise, no diplopia, conjunctival injection, tearing or dry eyes.    He has not noted any neck masses or lumps.  His voice sometimes grows raspy after singing but otherwise has no dysphagia or consistent hoarseness.    He does not have history of excessive head or neck radiation exposure.  Mother has hypothyroidism.  No family members with thyroid cancer.    Pertinent Social History: , no children; works in [employer].  Previously served in the [employer].    PAST MEDICAL HISTORY  Past Medical History:   Diagnosis Date     Disease of thyroid gland      Hypertension        MEDICATIONS  Current Outpatient Medications   Medication Sig Dispense Refill     atorvastatin (LIPITOR) 40 MG tablet Take 1 tablet (40 mg) by mouth daily 90 tablet 4     levothyroxine (SYNTHROID/LEVOTHROID) 200 MCG tablet [LEVOTHYROXINE (SYNTHROID, LEVOTHROID) 200 MCG TABLET] Take 1 tablet by mouth once daily. 90 tablet 0     losartan (COZAAR) 50 MG tablet Take 1 tablet (50 mg) by mouth daily 90 tablet 4       Allergies, family, and social history were reviewed and documented as needed in EHR.     REVIEW OF SYSTEMS  A complete 10-point ROS was performed and pertinent positives and negatives are noted in the HPI.     PHYSICAL EXAM  /84 (BP Location: Left arm, Patient Position: Sitting, Cuff Size: Adult Large)   Pulse 88   Wt 87.1 kg (192 lb)   BMI 32.45 kg/m    Body mass index is 32.45 kg/m .  Constitutional: Vital signs reviewed, as recorded above. Patient is alert, oriented and appears in no acute  distress.  Eyes: PER, EOMI, mild proptosis but no stare, lid lag, or retraction; no conjunctival injection.  Neck: Neck supple, no palpable thyroid tissue, well-healed surgical incision site.  Lymphatic: No cervical or supraclavicular LAD.  Cardiovascular: RRR, normal S1/S2, no audible murmurs, rubs or gallops; no LE edema.  Respiratory: CTAB, without wheezes, crackles or rhonchi; normal chest wall motion and respiratory effort.  MSK: No clubbing or cyanosis; normal muscle bulk and tone.  Skin: Normal skin color, temperature, turgor and texture, no purple striae.  Neurological: Alert and oriented times 3. No tremor.    DATA REVIEW  Each of the following laboratory and/or imaging studies were reviewed.    Office Visit on 10/11/2022   Component Date Value Ref Range Status     Cholesterol 10/11/2022 165  <200 mg/dL Final     Triglycerides 10/11/2022 134  <150 mg/dL Final     Direct Measure HDL 10/11/2022 37 (L)  >=40 mg/dL Final     LDL Cholesterol Calculated 10/11/2022 101 (H)  <=100 mg/dL Final     Non HDL Cholesterol 10/11/2022 128  <130 mg/dL Final     Sodium 10/11/2022 137  136 - 145 mmol/L Final     Potassium 10/11/2022 4.4  3.4 - 5.3 mmol/L Final     Chloride 10/11/2022 103  98 - 107 mmol/L Final     Carbon Dioxide (CO2) 10/11/2022 24  22 - 29 mmol/L Final     Anion Gap 10/11/2022 10  7 - 15 mmol/L Final     Urea Nitrogen 10/11/2022 13.4  6.0 - 20.0 mg/dL Final     Creatinine 10/11/2022 0.94  0.67 - 1.17 mg/dL Final     Calcium 10/11/2022 9.9  8.6 - 10.0 mg/dL Final     Glucose 10/11/2022 88  70 - 99 mg/dL Final     GFR Estimate 10/11/2022 >90  >60 mL/min/1.73m2 Final    Effective December 21, 2021 eGFRcr in adults is calculated using the 2021 CKD-EPI creatinine equation which includes age and gender (Jean-Paul et al., NEJ, DOI: 10.1056/AFJAtk5247619)     TSH 10/11/2022 9.26 (H)  0.30 - 4.20 uIU/mL Final     Free T4 10/11/2022 1.41  0.90 - 1.70 ng/dL Final         ASSESSMENT  1.  Hypothyroidism.  Postsurgical, with  "history of Graves' disease.  Labs drawn in 10/2022 showed under replacement with levothyroxine, however at that time he had been taking levothyroxine with other medications.  Has since been  levothyroxine from other medications and food.  We will therefore recheck thyroid function tests and adjust levothyroxine as needed.  He occasionally misses doses and we discussed the ability to \"catch up\" with levothyroxine the next day if he finds that he missed a dose the day prior.    2.  Question of thyroid cancer.  Primary care notes have indicated prior records of thyroid cancer, although the patient does not recall a diagnosis of thyroid cancer, postsurgical radioiodine therapy or follow-up imaging studies after thyroidectomy.  We will check thyroglobulin panel since we are drawing labs today.  Otherwise, I will await outside records before determining if any additional testing/treatment would be indicated.    PLAN  -Labs today  -For now, continue levothyroxine 200 mcg daily, separate from other medications and coffee/breakfast as already taking; please be mindful of any changes in levothyroxine pill color or shape (this might indicate a change in generic manufacture)  -Based on results, we will decide whether to adjust levothyroxine dose  -We will await records from endocrinology in [place]  -Return for a follow-up visit in 3 months  -We will communicate results by phone for initial thyroid function tests; if additional labs are normal, we will send letter      Orders Placed This Encounter   Procedures     TSH with free T4 reflex     Thyroglobulin and Antibody (Sendout to [facility])         I spent a total of 51 minutes on the date of encounter reviewing medical records, evaluating the patient, coordinating care and documenting in the EHR, as detailed above.      Didier Nuñez MD   Division of Diabetes, Endocrinology and Metabolism  Department of Medicine      cc: Ce Wilson, VERO CNP         "

## 2023-01-12 ENCOUNTER — TELEPHONE (OUTPATIENT)
Dept: ENDOCRINOLOGY | Facility: CLINIC | Age: 43
End: 2023-01-12

## 2023-01-12 DIAGNOSIS — E89.0 POSTOPERATIVE HYPOTHYROIDISM: Primary | ICD-10-CM

## 2023-01-12 RX ORDER — LEVOTHYROXINE SODIUM 200 UG/1
TABLET ORAL
Qty: 90 TABLET | Refills: 1 | Status: SHIPPED | OUTPATIENT
Start: 2023-01-12 | End: 2023-09-13

## 2023-01-12 NOTE — TELEPHONE ENCOUNTER
Endocrine team, please update patient with the following message:  -Thyroid hormone level is in ideal range.  Therefore, please continue levothyroxine 200 mcg daily as already taking: Continue to separate from other medications and coffee/breakfast by 30 to 60 minutes.  I have sent refill.  -Thyroglobulin level (thyroid cancer marker) is pending.  I will send a result letter with this result if it is within acceptable range.  -Follow-up in May 2023 as scheduled: Please have lab draw prior to that appointment (order placed).

## 2023-01-23 LAB — SCANNED LAB RESULT: NORMAL

## 2023-02-19 ASSESSMENT — SLEEP AND FATIGUE QUESTIONNAIRES
HOW LIKELY ARE YOU TO NOD OFF OR FALL ASLEEP WHILE SITTING INACTIVE IN A PUBLIC PLACE: SLIGHT CHANCE OF DOZING
HOW LIKELY ARE YOU TO NOD OFF OR FALL ASLEEP IN A CAR, WHILE STOPPED FOR A FEW MINUTES IN TRAFFIC: WOULD NEVER DOZE
HOW LIKELY ARE YOU TO NOD OFF OR FALL ASLEEP WHEN YOU ARE A PASSENGER IN A CAR FOR AN HOUR WITHOUT A BREAK: WOULD NEVER DOZE
HOW LIKELY ARE YOU TO NOD OFF OR FALL ASLEEP WHILE WATCHING TV: SLIGHT CHANCE OF DOZING
HOW LIKELY ARE YOU TO NOD OFF OR FALL ASLEEP WHILE SITTING QUIETLY AFTER LUNCH WITHOUT ALCOHOL: WOULD NEVER DOZE
HOW LIKELY ARE YOU TO NOD OFF OR FALL ASLEEP WHILE SITTING AND TALKING TO SOMEONE: WOULD NEVER DOZE
HOW LIKELY ARE YOU TO NOD OFF OR FALL ASLEEP WHILE LYING DOWN TO REST IN THE AFTERNOON WHEN CIRCUMSTANCES PERMIT: WOULD NEVER DOZE
HOW LIKELY ARE YOU TO NOD OFF OR FALL ASLEEP WHILE SITTING AND READING: WOULD NEVER DOZE

## 2023-02-20 ENCOUNTER — OFFICE VISIT (OUTPATIENT)
Dept: SLEEP MEDICINE | Facility: CLINIC | Age: 43
End: 2023-02-20
Attending: NURSE PRACTITIONER
Payer: COMMERCIAL

## 2023-02-20 VITALS
BODY MASS INDEX: 33.6 KG/M2 | WEIGHT: 196.8 LBS | OXYGEN SATURATION: 97 % | SYSTOLIC BLOOD PRESSURE: 137 MMHG | HEART RATE: 86 BPM | DIASTOLIC BLOOD PRESSURE: 80 MMHG | HEIGHT: 64 IN

## 2023-02-20 DIAGNOSIS — R06.81 WITNESSED APNEIC SPELLS: ICD-10-CM

## 2023-02-20 DIAGNOSIS — G47.33 OBSTRUCTIVE SLEEP APNEA: Primary | ICD-10-CM

## 2023-02-20 DIAGNOSIS — R06.83 SNORING: ICD-10-CM

## 2023-02-20 DIAGNOSIS — I10 ESSENTIAL HYPERTENSION: ICD-10-CM

## 2023-02-20 PROCEDURE — 99203 OFFICE O/P NEW LOW 30 MIN: CPT | Performed by: STUDENT IN AN ORGANIZED HEALTH CARE EDUCATION/TRAINING PROGRAM

## 2023-02-20 NOTE — NURSING NOTE
"Chief Complaint   Patient presents with     Sleep Problem     Snoring and possible sleep apnea - referred by PCP       Initial /80   Pulse 86   Ht 1.626 m (5' 4\")   Wt 89.3 kg (196 lb 12.8 oz)   SpO2 97%   BMI 33.78 kg/m   Estimated body mass index is 33.78 kg/m  as calculated from the following:    Height as of this encounter: 1.626 m (5' 4\").    Weight as of this encounter: 89.3 kg (196 lb 12.8 oz).    Medication Reconciliation: complete    Neck circumference: 43 centimeters.    DME: n/a    WatchPAT being sent out on Friday, 3/10/2023 and instructions for it sent via [employer].     Sandra Calderon MA  "

## 2023-02-20 NOTE — PROGRESS NOTES
[facility]  Consultation Note    Name: Choco Vu MRN#: [id number]   Age: 42 year old YOB: 1980     Date of Consultation: 02/20/23  Consultation is requested by: Ce Wilson  Primary care provider: VERO Duarte CNP    History of Present Illness:   Choco Vu is a 42 year old male patient with HTN, HLD, and Hypothyroidism   He is sent by Ce Wilson for a sleep consultation regarding Sleep Problem (Snoring and possible sleep apnea - referred by PCP)    Choco Vu main reason for visit: Wife says im snoring and stop breathing sometimes  Patient states problem(s) started: Not sure awhile i guess  Choco Vu's goals for this visit: Stop snoring and making wife worry about sleep    He has not had a previous sleep study.    CPAP: No    SLEEP-WAKE SCHEDULE:   Work/School Days: Patient goes to school/work: Yes   Usually gets into bed at 1030pm to 1100pm  Takes patient about 10 to 15 minutes to fall asleep  Has trouble falling asleep 0 nights per week  Wakes up in the middle of the night 3 times.  Wakes up due to Use the bathroom  He has trouble falling back asleep 0 times a week.   It usually takes Seconds to get back to sleep  Patient is usually up at 4am to 430am  Uses alarm: Yes    Weekends/Non-work Days/All Other Days:  Usually gets into bed at 10pm   Takes patient about 15 to 30 minutes to fall asleep  Patient is usually up at 7am  Uses alarm: Yes    Sleep Need  Patient gets  5 hours sleep on average   Patient thinks He needs about 5 hours sleep    Choco Vu prefers to sleep in this position(s): Side   Patient states they do the following activities in bed: Watch TV;Use phone, computer, or tablet    Naps  Patient takes a purposeful nap 2 times a week and naps are usually 1 or 2 hours in duration  He feels better after a nap: Yes  He dozes off unintentionally 1 or two time normaly at home after work playing video games days per week  Patient  has had a driving accident or near-miss due to sleepiness/drowsiness: No      SLEEP DISRUPTIONS:  Breathing/Snoring  Patient snores:No  Other people complain about His snoring: Yes  Patient has been told He stops breathing in His sleep:Yes  He has issues with the following: Morning mouth dryness;Stuffy nose when you wake up;Getting up to urinate more than once    Movement:  Patient gets pain, discomfort, with an urge to move:  No  It happens when He is resting:  No  It happens more at night:  No  Patient has been told Choco Vu kicks His legs at night:  No     Behaviors in Sleep:  Choco Vu has experienced the following behaviors while sleeping: Sleep-talking;Waking up paralyzed;Night terrors (screaming,yelling or acting afraid but not recalling event)  He has experienced sudden muscle weakness during the day: No    Is there anything else you would like your sleep provider to know:        CAFFEINE AND OTHER SUBSTANCES:  Patient consumes caffeinated beverages per day:  4  Last caffeine use is usually: 6pm  List of any prescribed or over the counter stimulants that patient takes:    List of any prescribed or over the counter sleep medication patient takes:    List of previous sleep medications that patient has tried:    Patient drinks alcohol to help them sleep: No  Patient drinks alcohol near bedtime: No    Family History:  Patient has a family member been diagnosed with a sleep disorder: No            SCALES:  EPWORTH SLEEPINESS SCALE    [place] Sleepiness Scale ( EMILY Ramos  [id number]<br>ESS - [place]/English - Final version - 21 Nov 07 - [facility] [place].) 2/19/2023   Sitting and reading Would never doze   Watching TV Slight chance of dozing   Sitting, inactive in a public place (e.g. a theatre or a meeting) Slight chance of dozing   As a passenger in a car for an hour without a break Would never doze   Lying down to rest in the afternoon when circumstances permit Would never doze   Sitting and  "talking to someone Would never doze   Sitting quietly after a lunch without alcohol Would never doze   In a car, while stopped for a few minutes in traffic Would never doze   [place] Score (MC) 2   [place] Score (Sleep) 2       INSOMNIA SEVERITY INDEX (GUCCI)    Insomnia Severity Index (GUCCI) 2/19/2023   Difficulty falling asleep 0   Difficulty staying asleep 1   Problems waking up too early 1   How SATISFIED/DISSATISFIED are you with your CURRENT sleep pattern? 2   How NOTICEABLE to others do you think your sleep problem is in terms of impairing the quality of your life? 3   How WORRIED/DISTRESSED are you about your current sleep problem? 2   To what extent do you consider your sleep problem to INTERFERE with your daily functioning (e.g. daytime fatigue, mood, ability to function at work/daily chores, concentration, memory, mood, etc.) CURRENTLY? 2   GUCCI Total Score 11   Guidelines for Scoring/Interpretation:  Total score categories:  0-7 = No clinically significant insomnia   8-14 = Subthreshold insomnia   15-21 = Clinical insomnia (moderate severity)  22-28 = Clinical insomnia (severe)  Used via courtesy of www.Pin-Digital.va.gov with permission from Van Aguilar PhD., [facility]      STOP BANG   AMAIRANI High Risk            Total Score: 5    AMAIRANI: Snores loudly    AMAIRANI: Observed stopped breathing    AMAIRANI: Hypertension    AMAIRANI: Neck Circum >16 in    AMAIRANI: Male        GAD7  No flowsheet data found.      CAGE-AID  No flowsheet data found.    CAGE-AID reprinted with permission from the [place] Medical Journal, OLIVIA Sullivan. and ASHLEY Mccrary, \"Conjoint screening questionnaires for alcohol and drug abuse\" [place] Medical Journal 94: 135-140, 1995.      PATIENT HEALTH QUESTIONNAIRE-9 (PHQ - 9)  PHQ-9 ([employer]) 8/24/2020   1.  Little interest or pleasure in doing things 0   2.  Feeling down, depressed, or hopeless 0     Developed by Jason Miner, Natacha Urbina, Kennedy Bennett and colleagues, with an educational " ivett from [employer]. No permission required to reproduce, translate, display or distribute.      Allergies:    No Known Allergies    Medications:    Current Outpatient Medications   Medication Sig Dispense Refill     atorvastatin (LIPITOR) 40 MG tablet Take 1 tablet (40 mg) by mouth daily 90 tablet 4     levothyroxine (SYNTHROID/LEVOTHROID) 200 MCG tablet [LEVOTHYROXINE (SYNTHROID, LEVOTHROID) 200 MCG TABLET] Take 1 tablet by mouth once daily. 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 Rx two pairs of glasses    2. Rx pro mask inserts       Tinea pedis 12/03/2021     Priority: Medium     Tobacco abuse 08/24/2020     Priority: Medium     Postoperative hypothyroidism 02/09/2018     Priority: Medium     Hx of grave's disease         Essential hypertension 02/09/2018     Priority: Medium     Mixed hyperlipidemia 02/09/2018     Priority: Medium        Past Medical/Surgical History:  Past Medical History:   Diagnosis Date     Disease of thyroid gland      Hypertension      Past Surgical History:   Procedure Laterality Date     APPENDECTOMY       TOTAL THYROIDECTOMY         Social History:  Social History     Socioeconomic History     Marital status:      Spouse name: Not on file     Number of children: Not on file     Years of education: Not on file     Highest education level: Not on file   Occupational History     Not on file   Tobacco Use     Smoking status: Every Day     Packs/day: 1.00     Types: Cigarettes     Smokeless tobacco: Former   Vaping Use     Vaping Use: Never used   Substance and  "Sexual Activity     Alcohol use: Not on file     Comment: Alcoholic Drinks/day: 3 beers per month     Drug use: No     Sexual activity: Yes     Partners: Female   Other Topics Concern     Not on file   Social History Narrative     Not on file     Social Determinants of Health     Financial Resource Strain: Not on file   Food Insecurity: Not on file   Transportation Needs: Not on file   Physical Activity: Not on file   Stress: Not on file   Social Connections: Not on file   Intimate Partner Violence: Not on file   Housing Stability: Not on file       Family History:  Family History   Problem Relation Age of Onset     Hypertension Mother      No Known Problems Father        Review of Systems:   A complete review of systems reviewed by me is negative with the exeption of what has been mentioned in the history of present illness.  In the last TWO WEEKS have you experienced any of the following symptoms?  Fevers: No  Night Sweats: No  Weight Gain: No  Pain at Night: No  Double Vision: No  Changes in Vision: No  Difficulty Breathing through Nose: Yes  Sore Throat in Morning: No  Dry Mouth in the Morning: Yes  Shortness of Breath Lying Flat: No  Shortness of Breath With Activity: No  Awakening with Shortness of Breath: No  Increased Cough: No  Heart Racing at Night: No  Swelling in Feet or Legs: No  Diarrhea at Night: No  Heartburn at Night: No  Urinating More than Once at Night: Yes  Losing Control of Urine at Night: No  Joint Pains at Night: No  Headaches in Morning: No  Weakness in Arms or Legs: No  Depressed Mood: No  Anxiety: No     Physical Exam:   Vitals: /80   Pulse 86   Ht 1.626 m (5' 4\")   Wt 89.3 kg (196 lb 12.8 oz)   SpO2 97%   BMI 33.78 kg/m    BMI= Body mass index is 33.78 kg/m .  Neck Cir (cm): 43 cm  Mandibular profile: No retrognathia  Mallampati Class: II.  Tonsillar Stage: 1  hidden by pillars.  GENERAL: Healthy, alert and no distress  EYES: Eyes grossly normal to inspection.  No discharge or " erythema, or obvious scleral/conjunctival abnormalities.  RESP: No audible wheeze, cough, or visible cyanosis.  No visible retractions or increased work of breathing.    SKIN: Visible skin clear. No significant rash, abnormal pigmentation or lesions.  NEURO: Cranial nerves grossly intact.  Mentation and speech appropriate for age.  PSYCH: Mentation appears normal, affect normal/bright, judgement and insight intact, normal speech and appearance well-groomed.         Data: All pertinent previous laboratory data reviewed     Recent Labs   Lab Test 10/11/22  1230 12/03/21  1445    138   POTASSIUM 4.4 4.1   CHLORIDE 103 103   CO2 24 22   ANIONGAP 10 13   GLC 88 81   BUN 13.4 13   CR 0.94 0.94   ASHOK 9.9 10.0       No results for input(s): WBC, RBC, HGB, HCT, MCV, MCH, MCHC, RDW, PLT in the last 72265 hours.    Recent Labs   Lab Test 12/03/21  1445   PROTTOTAL 7.8   ALBUMIN 4.3   BILITOTAL 0.6   ALKPHOS 81   AST 20   ALT 36       TSH (uIU/mL)   Date Value   01/04/2023 0.72   10/11/2022 9.26 (H)   03/05/2022 1.44   12/03/2021 31.53 (H)       No results found for: UAMP, UBARB, BENZODIAZEUR, UCANN, UCOC, OPIT, UPCP    No results found for: IRONSAT, [id number], RODO    No results found for: PH, PHARTERIAL, PO2, [id number], SAT, PCO2, HCO3, BASEEXCESS, SONNY, BEB    @LABRCNTIPR(phv:4,pco2v:4,po2v:4,hco3v:4,mauricio:4,o2per:4)@    Echocardiology: No results found for this or any previous visit (from the past 4320 hour(s)).    Chest x-ray: No results found for this or any previous visit from the past 365 days.      Chest CT: No results found for this or any previous visit from the past 365 days.      PFT: Most Recent Breeze Pulmonary Function Testing  No results found    Assessment and Plan:     Problem List Items Addressed This Visit        Respiratory    Obstructive sleep apnea - Primary     STOP BANG score is 5/8. Patient likely has sleep apnea based on clinical history of Snoring, subjective daytime sleepiness, hypertension,  witnessed apneas, neck circumference, gender, nocturia, insomnia (GUCCI 11/28), and morning dry mouth.   Obstructive sleep apnea reviewed. Complications of Untreated Sleep Apnea Reviewed.  HST/ Polysomnography reviewed. Information provided regarding treatment options for AMAIRANI.    Recommend HST due to high pretest probability of AMAIRANI    Patient prefers CPAP if treatment is required         Relevant Orders    HST - Home Sleep Apnea Test - WatchPat Non-Returnable       Circulatory    Essential hypertension   Other Visit Diagnoses     Witnessed apneic spells        Snoring                Patient was strongly advised to avoid driving, operating any heavy machinery or other hazardous situations while drowsy or sleepy.  Patient was counseled on the importance of driving while alert, to pull over if drowsy, or nap before getting into the vehicle if sleepy.      Plan for Choco Vu to follow up will be determined at time of HST results.    The above note was dictated using voice recognition software. Although reviewed after completion, some word and grammatical error may remain . Please contact the author for any clarifications.    Total time spent reviewing medical records, history and physical examination, review of previous testing and interpretation as well as documentation on this date, 02/20/23: 42 minutes    Rose Potts MD on 2/20/2023   [facility]   [address]oor [address], [address]   [address] [place] [address]. [place] [address]   Appointments: [phone]    CC: Ce Wilson

## 2023-02-20 NOTE — ASSESSMENT & PLAN NOTE
STOP BANG score is 5/8. Patient likely has sleep apnea based on clinical history of Snoring, subjective daytime sleepiness, hypertension, witnessed apneas, neck circumference, gender, nocturia, insomnia (GUCCI 11/28), and morning dry mouth.   Obstructive sleep apnea reviewed. Complications of Untreated Sleep Apnea Reviewed.  HST/ Polysomnography reviewed. Information provided regarding treatment options for AMAIRANI.    Recommend HST due to high pretest probability of AMAIRANI    Patient prefers CPAP if treatment is required

## 2023-02-20 NOTE — PATIENT INSTRUCTIONS
"MY INFORMATION ON SLEEP APNEA-  Choco Vu    DOCTOR : Rose Potts MD  SLEEP CENTER :      MY CONTACT NUMBER:    [facility]  [phone]  [facility]    A sleep study will be scheduled to rule out sleep apnea  Watch the video for the device you are using:  WatchPAT1  Disposable device sent out require phone/computer application: [email or web address]/watch?v=[email or web address]_[email or web address]  The sleep lab will call you for this appointment   If you wish to reschedule the sleep study or contact the sleep lab scheduling call [phone]  Results will be discussed via [employer]  Follow up will be determined based on sleep study results      Key Points:  1. What is Obstructive Sleep Apnea (AMAIRANI)? AMAIRANI is the most common type of sleep apnea. Apnea literally means, \"without breath.\" It is characterized by repetitive pauses in breathing, despite continued effort to breathe, and is usually associated with a reduction in blood oxygen saturation. Apneas can last 10 to over 60 seconds. It is caused by narrowing or collapse of the upper airway as muscles relax during sleep.   Apneas are any events where there is no breathing.  Hypopneas are any events where there is shallow breathing. Sleep studies will track and then add all of the apneas and hypopneas into a total and then divided this number by 60 to obtain the apnea hypopnea index(AHI). 0-5 events/per hour = No Sleep Apnea; 5-15 events/per hour = Mild Sleep Apnea; 15-30 events/per hour = Moderate Sleep Apnea; Greater than 30 events/per hour = Severe Sleep Apnea.  Sleep apnea is typically worse during REM sleep due to complete muscle relaxation, including the muscles of the airway. Sleep apnea is also typically worse during supine(sleeping on your back) sleep due to internal structures more easily falling on the top of the airway and external pressures more easily crushing the airway.  The respiratory disturbance index(RDI) includes " apneas, hypopneas, and other respiratory events such as snoring that may disturb your sleep.    2. What are the consequences of AMAIRANI? Symptoms include: daytime sleepiness- possibly increasing the risk of falling asleep while driving, unrefreshing/restless sleep, snoring, insomnia, waking frequently to urinate, waking with heartburn or reflux, reduced concentration and memory, and morning headaches. Other health consequences may include development of high blood pressure. Untreated AMAIRANI also can contribute to heart disease, stroke and diabetes.   3. What are the treatment options? In most situations, sleep apnea is a lifelong disease that must be managed with daily therapy. Continuous Positive Airway (CPAP) is the most reliable treatment. A mouthguard to hold your jaw forward is usually the next most reliable option. Other options include postioning devices (to keep you off your back), nasal valves, tongue retaining device, weight loss, surgery. There is more detail about these options toward the end of this document.  4. What are the most important things to remember about using CPAP?     WHERE CAN I FIND MORE INFORMATION?    American Academy of Sleep Medicine Patient information on sleep disorders:  http://yoursleep.aasmnet.org    CPAP -  WHY AND HOW?                 Continuous positive airway pressure, or CPAP, is the most effective treatment for obstructive sleep apnea. It works by blowing room air, through a mask, to hold your throat open. A decision to use CPAP is a major step forward in the pursuit of a healthier life. The successful use of CPAP will help you breathe easier, sleep better and live healthier. Using CPAP can be a positive experience if you keep these ruelas points in mind:  Commitment  CPAP is not a quick fix for your problem. It involves a long-term commitment to improve your sleep and your health.    Communication  Stay in close communication with both your sleep doctor and your CPAP supplier. Ask  "lots of questions and seek help when you need it.    Consistency  Use CPAP all night, every night and for every nap. You will receive the maximum health benefits from CPAP when you use it every time that you sleep. This will also make it easier for your body to adjust to the treatment.    Correction  The first machine and mask that you try may not be the best ones for you. Work with your sleep doctor and your CPAP supplier to make corrections to your equipment selection. Ask about trying a different type of machine or mask if you have ongoing problems. Make sure that your mask is a good fit and learn to use your equipment properly.    Challenge  Tell a family member or close friend to ask you each morning if you used your CPAP the previous night. Have someone to challenge you to give it your best effort.    Connection   Your adjustment to CPAP will be easier if you are able to connect with others who use the same treatment. Ask your sleep doctor if there is a support group in your area for people who have sleep apnea, or look for one on the Internet.  Comfort   Increase your level of comfort by using a saline spray, decongestant or heated humidifier if CPAP irritates your nose, mouth or throat. Use your unit's \"ramp\" setting to slowly get used to the air pressure level. There may be soft pads you can buy that will fit over your mask straps. Look on www.CPAP.com for accessories that can help make CPAP use more comfortable.  Cleaning   Clean your mask, tubing and headgear on a regular basis. Put this time in your schedule so that you don't forget to do it. Check and replace the filters for your CPAP unit and humidifier.    Completion   Although you are never finished with CPAP therapy, you should reward yourself by celebrating the completion of your first month of treatment. Expect this first month to be your hardest period of adjustment. It will involve some trial and error as you find the machine, mask and pressure " settings that are right for you.    Continuation  After your first month of treatment, continue to make a daily commitment to use your CPAP all night, every night and for every nap.    CPAP-Tips to starting with success:  Begin using your CPAP for short periods of time during the day while you watch TV or read.    Use CPAP every night and for every nap. Using it less often reduces the health benefits and makes it harder for your body to get used to it.    Newer CPAP models are virtually silent; however, if you find the sound of your CPAP machine to be bothersome, place the unit under your bed to dampen the sound.     Make small adjustments to your mask, tubing, straps and headgear until you get the right fit. Tightening the mask may actually worsen the leak.  If it leaves significant marks on your face or irritates the bridge of your nose, it may not be the best mask for you.  Speak with the person who supplied the mask and consider trying other masks. Insurances will allow you to try different masks during the first month of starting CPAP.  Insurance also covers a new mask, hose and filter about every 6 months.    Use a saline nasal spray to ease mild nasal congestion. Neti-Pot or saline nasal rinses may also help. Nasal gel sprays can help reduce nasal dryness.  Biotene mouthwash can be helpful to protect your teeth if you experience frequent dry mouth.  Dry mouth may be a sign of air escaping out of your mouth or out of the mask in the case of a full face mask.  Speak with your provider if you expect that is the case.     Take a nasal decongestant to relieve more severe nasal or sinus congestion.  Do not use Afrin (oxymetazoline) nasal spray more than 3 days in a row.  Speak with your sleep doctor if your nasal congestion is chronic.    Use a heated humidifier that fits your CPAP model to enhance your breathing comfort. Adjust the heat setting up if you get a dry nose or throat, down if you get condensation in  the hose or mask.  Position the CPAP lower than you so that any condensation in the hose drains back into the machine rather than towards the mask.    Try a system that uses nasal pillows if traditional masks give you problems.    Clean your mask, tubing and headgear once a week. Make sure the equipment dries fully.    Regularly check and replace the filters for your CPAP unit and humidifier.    Work closely with your sleep provider and your CPAP supplier to make sure that you have the machine, mask and air pressure setting that works best for you. It is better to stop using it and call your provider to solve problems than to lay awake all night frustrated with the device.    BESIDES CPAP, WHAT OTHER THERAPIES ARE THERE?  Postioning devices if you only have the snoring or apnea while on your back  Dental devices if your condition is mild  Nasal valves may be effective though experience is limited  Weight loss if you are overweight  Surgery in limited cases where devices are not acceptable or there are problems with structures in the nose and throat  If treated with one of these alternative options, further evaluation is necessary to ensure that the therapy is effective. This may require some form of repeat testing.      Healthy Lifestyle:  Healthy diet, exercise and limit alcohol: Not only will excessive alcohol increase your weight over time, but it irritates the throat tissues and make them swell, shrinking the airway and causing snoring. Drinking alcohol should be limited and stopped within 3-4 hours before going to bed.   Stop smoking: (Red swollen throat, heat, nicotine), also irritates and swells the airway, among numerous other negative health consequences.    Positioning Device  This example shows a pillow that straps around the waist. It may be appropriate for those whose sleep study shows milder sleep apnea that occurs primarily when lying flat on one's back. Preliminary studies have shown benefit but  effectiveness at home should be verified.                      Oral Appliance  These are examples of two of many custom-made devices that are more likely to work in mild sleep apnea   Oral appliances are dental mouth pieces that fit very much like a sports mouth guards or removable orthodontic retainers. They are used to treat snoring and obstructive sleep apnea . The device prevents the airway from collapsing by either supporting the jaw in a forward position. Since oral appliances are non-invasive and easy to use, they may be considered as an early treatment option. Oral appliance therapy (OAT) involves the customization, selection, fabrication, fitting, adjustments and long-term follow-up care.  Custom made oral appliances are proven to be more effective than over-the-counter devices. Therefore, the over-the-counter devices are recommended not to be used as a screening tool nor as a therapeutic option.     Who gets a dental device?  Oral appliance therapy can be used as an alternative to CPAP therapy for the treatment of mild to moderate sleep apnea and for those patients who prefer OAT to CPAP. Oral appliance therapy is a first line therapy for the treatment of primary snoring. Additionally, OAT is an option for those that cannot tolerate CPAP as therapy or who have experienced insufficient surgical results.                 Possible side effects?  Frequent but minor side effects include: excessive salivation, dry mouth, discomfort of teeth and jaw and temporary changes in the patient s bite.  Potential complications include: jaw pain, permanent occlusal changes and TMJ symptoms.  The above mentioned side effects and complications can be recognized and managed by dentists trained in dental sleep medicine.   Finding a dentist that practices dental sleep medicine  Specific training is available through the American Academy of Dental Sleep Medicine for dentists interested in working in the field of sleep. To find a  dentist who is educated in the field of sleep and the use of oral appliances, near you, visit the Web site of the American Academy of Dental Sleep Medicine; also see http://www.accpstorage.org/newOrganization/patients/oralAppliances.pdf   To search for a dentist certified in these practices:  Http://aadsm.org/FindADentist.aspx?1  Nasal Valves              Nasal valves may be an option for mild apnea if other options are not well tolerated. The efficacy of these devices is generally less than CPAP or oral appliances.They may not be effective if you have frequent nasal congestion or have difficulty breathing through your nose.   Weight Loss:    Weight loss decreases severity of sleep apnea in most people with obesity. For those with mild obesity who have developed snoring with weight gain, even 15-30 pound weight loss can improve and occasionally eliminate sleep apnea.  Structured and life-long dietary and health habits are necessary to lose weight and keep healthier weight levels.     Though there are significant health benefits from weight loss, long-term weight loss is very difficult to achieve- studies show success with dietary management in less than 10% of people. In addition, substantial weight loss may require years of dietary control and may be difficult if patients have severe obesity. In these cases, surgical management may be considered.    If you are interested in methods for weight loss, you should review the options discussed at the National Institutes of Health patient information sites:     http://win.niddk.nih.gov/publications/index.htm  http:/www.health.nih.gov/topic/WeightLossDieting    Bariatric programs offer counseling in all methods of weight loss:    Http:/www.[facility].org/Specialties/WeightLossSurgeryandMedicalMgmt/htm    Your BMI is Body mass index is 33.78 kg/m .    Weight management plan: Discussed healthy diet and exercise guidelines    Body mass index (BMI) is one way to tell  whether you are at a healthy weight, overweight, or obese. It measures your weight in relation to your height.  A BMI of 18.5 to 24.9 is in the healthy range. A person with a BMI of 25 to 29.9 is considered overweight, and someone with a BMI of 30 or greater is considered obese.  Another way to find out if you are at risk for health problems caused by overweight and obesity is to measure your waist. If you are a woman and your waist is more than 35 inches, or if you are a man and your waist is more than 40 inches, your risk of disease may be higher.  More than two-thirds of American adults are considered overweight or obese. Being overweight or obese increases the risk for further weight gain.  Excess weight may lead to heart disease and diabetes. Creating and following plans for healthy eating and physical activity may help you improve your health.    Methods for maintaining or losing weight.    Weight control is part of healthy lifestyle and includes exercise, emotional health, and healthy eating habits.  Careful eating habits lifelong is the mainstay of weight control.  Though there are significant health benefits from weight loss, long-term weight loss with diet alone may be very difficult to achieve- studies show long-term success with dietary management in less than 10% of people. Attaining a healthy weight may be especially difficult to achieve in those with severe obesity. In some cases, medications, devices and surgical management might be considered.    What can you do?    If you are overweight or obese and are interested in methods for weight loss, you should discuss this with your provider. In addition, we recommend that you review healthy life styles and methods for weight loss available through the National Institutes of Health patient information sites:     http://win.niddk.nih.gov/publications/index.htm      Surgery:  There are a number of surgeries that have been attempted to treat apnea. In  "general, surgical options are usually reserved for cases in which there is a physical abnormality contributing to obstruction or other treatment options are ineffective or not tolerated. Most surgical options are either unreliable or quite invasive. One of the more common procedures is:  Uvulopalatopharyngoplasty: In this procedure, the uvula (the finger-like tissue that hangs in the back of the throat), part of the soft palate (the tissue that the uvula is attached to), and sometimes the tonsils or adenoids are removed. The efficacy of this surgery is around 30-50% .  After surgery, complications may include:  Sleepiness and sleep apnea related to post-surgery medication   Swelling, infection and bleeding   A sore throat and/or difficulty swallowing   Drainage of secretions into the nose and a nasal quality to the voice. English language speech does not seem to be affected by this surgery.   Narrowing of the airway in the nose and throat (hence constricting breathing) snoring and even iatrogenically caused sleep apnea. By cutting the tissues, excess scar tissue can \"tighten\" the airway and make it even smaller than it was before UPPP.  Patients who have had the uvula removed will become unable to correctly speak [place] or any other language that has a uvular 'r' phoneme.    Surgeries to help resolve nasal congestion may help reduce the severity of apnea slightly. Nasal congestion does not cause apnea on its own, so these surgeries are usually not performed just for AMAIRANI.  They may be worth considering if the nasal congestion is significantly bothersome independent of apnea.  "

## 2023-03-10 ENCOUNTER — VIRTUAL VISIT (OUTPATIENT)
Dept: SLEEP MEDICINE | Facility: CLINIC | Age: 43
End: 2023-03-10
Payer: COMMERCIAL

## 2023-03-10 DIAGNOSIS — G47.33 OBSTRUCTIVE SLEEP APNEA: ICD-10-CM

## 2023-03-10 PROCEDURE — 95800 SLP STDY UNATTENDED: CPT

## 2023-03-10 NOTE — PROGRESS NOTES
Device has been registered and shipped via [employer] on 3/10/2023. Patient was notified that package was mailed out.

## 2023-03-20 NOTE — PROGRESS NOTES
Watch Pat has been scored using rule 1B, 4%.  Patient to follow up with provider to determine appropriate therapy.    PAT AHI: 18.4    Ordering Provider: Rose Potts MD

## 2023-03-20 NOTE — PROCEDURES
"WatchPAT - HOME SLEEP STUDY INTERPRETATION    Patient: Choco Vu  MRN: [id number]  YOB: 1980  Study Date: 3/10/2023  Referring Provider: Ce Wilson  Ordering Provider: Rose Potts MD      Chain of custody patient verification was not enabled.  Chain of custody verification was not present throughout the entire study.     Indications for Home Study: Choco Vu is a 42 year old male with a history of HTN, HLD, and Hypothyroidism who presents with symptoms suggestive of obstructive sleep apnea.    Estimated body mass index is 33.78 kg/m  as calculated from the following:    Height as of 2/20/23: 1.626 m (5' 4\").    Weight as of 2/20/23: 89.3 kg (196 lb 12.8 oz).  Total score - [place]: 2 (2/19/2023  7:47 PM)  Total Score: 5 (2/19/2023  7:48 PM)    Data: A full night home sleep study was performed recording the standard physiologic parameters including peripheral arterial tonometry (PAT), sound/snoring, body position,  movement, sound, and oxygen saturation by pulse oximetry. Pulse rate was estimated by oximetry recording. Sleep staging (wake, REM, light, and deep sleep) was derived from PAT signal.  This study was considered adequate based on > 4 hours of quality oximetry and respiratory recording. As specified by the AASM Manual for the Scoring of Sleep and Associated events, version 2.3, Rule VIII.D 1B, 4% oxygen desaturation scoring for hypopneas is used as a standard of care on all home sleep apnea testing.    Total Recording Time: 8 hrs, 45 min  Total Sleep Time: 7 hrs, 54 min  % of Sleep Time REM: 29.2%    Respiratory:  Snoring: Snoring was present.  Respiratory events: The PAT respiratory disturbance index [pRDI] was 19.7 events per hour.  The PAT apnea/hypopnea index [pAHI] was 18.4 events per hour.  ИВАН was 17.6 events per hour.  During REM sleep the pAHI was 38.9.  Sleep Associated Hypoxemia: sustained hypoxemia was present. Mean oxygen saturation was 93%.  Minimum " was 81%.  Time with saturation less than 88% was 10.1 minutes.    Heart Rate: By pulse oximetry normal rate was noted.     Position: Percent of time spent: supine -63.4%, prone -0%, on right -36.6%, on left -0%.  pAHI was 27.8 per hour supine, 0 per hour prone, 2.4 per hour on right side, and 0 per hour on left side.     Assessment:   Moderate obstructive sleep apnea.  Sleep associated hypoxemia was present.    Recommendations:  Consider auto-CPAP at 5-15 cmH2O, oral appliance therapy or positional therapy.  Suggest optimizing sleep hygiene and avoiding sleep deprivation.  Weight management.    Diagnosis Code(s): Obstructive Sleep Apnea G47.33, Hypoxemia G47.36    Rose Potts MD, March 20, 2023   Diplomate, American Board of Internal Medicine, Sleep Medicine

## 2023-05-08 ENCOUNTER — LAB (OUTPATIENT)
Dept: LAB | Facility: CLINIC | Age: 43
End: 2023-05-08
Payer: COMMERCIAL

## 2023-05-08 DIAGNOSIS — E89.0 POSTOPERATIVE HYPOTHYROIDISM: ICD-10-CM

## 2023-05-08 LAB — TSH SERPL DL<=0.005 MIU/L-ACNC: 0.61 UIU/ML (ref 0.3–4.2)

## 2023-05-08 PROCEDURE — 36415 COLL VENOUS BLD VENIPUNCTURE: CPT

## 2023-05-08 PROCEDURE — 84443 ASSAY THYROID STIM HORMONE: CPT

## 2023-05-09 ENCOUNTER — DOCUMENTATION ONLY (OUTPATIENT)
Dept: SLEEP MEDICINE | Facility: CLINIC | Age: 43
End: 2023-05-09
Payer: COMMERCIAL

## 2023-05-09 ENCOUNTER — MYC MEDICAL ADVICE (OUTPATIENT)
Dept: SLEEP MEDICINE | Facility: CLINIC | Age: 43
End: 2023-05-09

## 2023-05-09 DIAGNOSIS — G47.33 OBSTRUCTIVE SLEEP APNEA: Primary | ICD-10-CM

## 2023-05-09 NOTE — PROGRESS NOTES
Patient was offered choice of vendor and chose [facility].  Patient Choco Vu was set up at [place]  on May 9, 2023. Patient received a Resmed Airsense 10 Pressures were set at 5-15 cm H2O.   Patient s ramp is 4 cm H2O for Auto and FLEX/EPR is EPR, 2.  Patient received a Resmed Mask name: Airfit [id number]  Nasal mask size Small frame, Small cushion, heated tubing and heated humidifier.  Patient has the following compliance requirements: none  Patient has a follow up recommended with Dr Potts.    Tracy L Fahrenkamp

## 2023-05-12 ENCOUNTER — OFFICE VISIT (OUTPATIENT)
Dept: ENDOCRINOLOGY | Facility: CLINIC | Age: 43
End: 2023-05-12
Payer: COMMERCIAL

## 2023-05-12 ENCOUNTER — DOCUMENTATION ONLY (OUTPATIENT)
Dept: SLEEP MEDICINE | Facility: CLINIC | Age: 43
End: 2023-05-12
Payer: COMMERCIAL

## 2023-05-12 VITALS
BODY MASS INDEX: 33.47 KG/M2 | SYSTOLIC BLOOD PRESSURE: 124 MMHG | WEIGHT: 195 LBS | HEART RATE: 76 BPM | DIASTOLIC BLOOD PRESSURE: 86 MMHG

## 2023-05-12 DIAGNOSIS — E89.0 POSTOPERATIVE HYPOTHYROIDISM: Primary | ICD-10-CM

## 2023-05-12 DIAGNOSIS — E05.00 GRAVES' DISEASE: ICD-10-CM

## 2023-05-12 PROCEDURE — 99213 OFFICE O/P EST LOW 20 MIN: CPT | Performed by: INTERNAL MEDICINE

## 2023-05-12 NOTE — PATIENT INSTRUCTIONS
-Continue levothyroxine 200 mcg daily, separate from other medications and coffee/breakfast as already taking; please be mindful of any changes in levothyroxine pill color or shape (this might indicate a change in generic manufacture)  -Labs in 6 months  -Follow-up in one year, with labs and neck ultrasound before visit  -We will communicate results by phone

## 2023-05-12 NOTE — LETTER
"    5/12/2023         RE: Choco Vu  [address] [place] [address]        Dear Colleague,    Thank you for referring your patient, Choco Vu, to the [facility]. Please see a copy of my visit note below.      ENDOCRINOLOGY FOLLOW-UP        HISTORY OF PRESENT ILLNESS    Choco Vu is seen in follow-up.    1.  Possible history of thyroid cancer.    No change in the feel of his neck.  No lymphadenopathy.    We [facility].    Thyroglobulin panel sent to the [facility] on 1/4/2023 showed a thyroglobulin level of 0.12 ng/mL, with antibody of less than 0.4 U/mL.  Concurrent TSH 0.72.    2.  Postsurgical hypothyroidism.  Continues on levothyroxine 200 mcg daily.   it from breakfast/coffee and other medications as we discussed at last visit.    Energy is fairly good.  He was newly diagnosed with sleep apnea in 3/2023 and just started use of CPAP machine.  He finds his fatigue has improved.  He has no tremors or palpitations.    3.  History of Graves' disease.  No eye discomfort, redness or vision changes.    Pertinent endocrine and related history:  1.  Probable history of thyroid cancer.  Care Everywhere diagnosis summary indicates \"papillary microcarcinoma.\"  The patient himself does not recall a diagnosis of thyroid cancer.  He recalls having radioiodine scan for diagnosis of Graves' disease but does not recall radioiodine therapy after thyroidectomy, nor does he recall having neck ultrasounds.  -No history of excessive head or neck radiation exposure.  Mother has hypothyroidism, no family members with thyroid cancer.  2.  History of Graves' disease.  -He recalls that he initially presented with eye symptoms concerning for Graves' ophthalmopathy.  He was found to be hyperthyroid and after discussion of options he underwent thyroidectomy.  Surgery was performed in [place] (he " was stationed in [facility] and the [employer] flew him to [place] for endocrine care).  -Record of operative note from thyroidectomy performed on 8/1/2012 indicates that patient had history of Graves' disease and preferred surgical treatment of Graves' hyperthyroidism.  Total thyroidectomy was performed and based on review of operative note, it does not appear that there were unusual/remarkable findings.    Pertinent Social History: , no children; works in [employer].  Previously served in the [employer].    PAST MEDICAL HISTORY  Past Medical History:   Diagnosis Date     Disease of thyroid gland      Hypertension        MEDICATIONS  Current Outpatient Medications   Medication Sig Dispense Refill     atorvastatin (LIPITOR) 40 MG tablet Take 1 tablet (40 mg) by mouth daily 90 tablet 4     levothyroxine (SYNTHROID/LEVOTHROID) 200 MCG tablet [LEVOTHYROXINE (SYNTHROID, LEVOTHROID) 200 MCG TABLET] Take 1 tablet by mouth once daily. 90 tablet 1     losartan (COZAAR) 50 MG tablet Take 1 tablet (50 mg) by mouth daily 90 tablet 4       Allergies, family, and social history were reviewed and documented as needed in EHR.     REVIEW OF SYSTEMS  A focused ROS was performed, with pertinent positives and negatives as noted in the HPI.    PHYSICAL EXAM  /86 (BP Location: Right arm, Patient Position: Sitting, Cuff Size: Adult Large)   Pulse 76   Wt 88.5 kg (195 lb)   BMI 33.47 kg/m    Body mass index is 33.47 kg/m .  Constitutional: Vital signs reviewed, as recorded above. Patient is alert, oriented and appears in no acute distress.  Eyes: PER, EOMI, mild proptosis but no stare, lid lag, or retraction; no conjunctival injection.  Neck: Neck supple, no palpable thyroid tissue, well-healed surgical incision site.  Lymphatic: No cervical or supraclavicular LAD.  Cardiovascular: RRR, normal S1/S2, no audible murmurs, rubs or gallops; no LE edema.  MSK: No clubbing or cyanosis; normal muscle bulk and tone.  Skin: Normal skin color,  temperature, turgor and texture.  Neurological: Alert and oriented times 3. No tremor.    DATA REVIEW  Each of the following laboratory and/or imaging studies were reviewed.    Component      Latest Ref Rng 1/4/2023  3:42 PM   TSH      0.30 - 4.20 uIU/mL 0.72    See Scanned Result THYROGLOBULIN AND ANTIBODY (SENDOUT TO [facility])-Scanned      Component      Latest Ref Rng 5/8/2023  7:52 AM   TSH      0.30 - 4.20 uIU/mL 0.61    See Scanned Result            ASSESSMENT  1.  Hypothyroidism.  Postsurgical, with history of Graves' disease.  Thyroid function tests drawn prior to this visit remain in optimal range.  Continue levothyroxine without changes.  Check thyroid function tests in 6 months.  Follow-up in clinic in 1 year.    2.  Question of thyroid cancer.  Primary care notes have indicated prior records of thyroid cancer, although the patient does not recall a diagnosis of thyroid cancer, postsurgical radioiodine therapy or follow-up imaging studies after thyroidectomy.  We have requested records from the [facility] he was treated at but have not received them: His treatment was long enough ago that records may not be available.  Thyroglobulin panel checked after initial visit in 1/2023 shows very low thyroglobulin level with negative antibodies.  Discussed neck ultrasound to make sure there is no suspicious lymphadenopathy, however the patient notes that he has had significant medical costs recently and would like to defer this if at all possible.  We will therefore plan for neck ultrasound prior to visit in 1 year.    3.  Graves' disease.  No evidence of extrathyroidal manifestations.    PLAN  -Continue levothyroxine 200 mcg daily, separate from other medications and coffee/breakfast as already taking; please be mindful of any changes in levothyroxine pill color or shape (this might indicate a change in generic manufacture)  -Labs in 6 months  -Follow-up in one year, with labs and neck ultrasound before  visit  -We will communicate results by phone     Orders Placed This Encounter   Procedures     US Head Neck Soft Tissue     TSH with free T4 reflex       I spent a total of 21 minutes on the date of encounter reviewing medical records, evaluating the patient, coordinating care and documenting in the EHR, as detailed above.      Lito Nuñez MD   Division of Diabetes, Endocrinology and Metabolism  Department of Medicine             Again, thank you for allowing me to participate in the care of your patient.        Sincerely,        LITO Nuñez MD

## 2023-05-12 NOTE — PROGRESS NOTES
3 day Sleep therapy management telephone visit    Diagnostic AHI:  18.4 HST    Confirmed with patient at time of call- Yes Patient is still interested in STM service       Subjective measures:  Patient doing pretty good. His wife stated he has stop snoring during he night.  Sandy hears his mask leaking during the night. We talked about doing a mask exchange.     Objective data         Order Settings for PAP  CPAP min     CPAP max              Device settings from machine CPAP min 5.0     CPAP max 15.0           EPR Setting TWO    RESMED soft response  OFF     Assessment: Nightly usage over four hours      Action plan: Patient to have 14 day STM visit. Patient has a follow up visit scheduled:   no    Replacement device: No  STM ordered by provider: Yes     Total time spent on accessing and  interpreting remote patient PAP therapy data  10 minutes    Total time spent counseling, coaching  and reviewing PAP therapy data with patient  7 minutes    [id number] yes

## 2023-05-12 NOTE — PROGRESS NOTES
"  ENDOCRINOLOGY FOLLOW-UP        HISTORY OF PRESENT ILLNESS    Choco Vu is seen in follow-up.    1.  Possible history of thyroid cancer.    No change in the feel of his neck.  No lymphadenopathy.    [facility].    Thyroglobulin panel sent to the [facility] on 1/4/2023 showed a thyroglobulin level of 0.12 ng/mL, with antibody of less than 0.4 U/mL.  Concurrent TSH 0.72.    2.  Postsurgical hypothyroidism.  Continues on levothyroxine 200 mcg daily.   it from breakfast/coffee and other medications as we discussed at last visit.    Energy is fairly good.  He was newly diagnosed with sleep apnea in 3/2023 and just started use of CPAP machine.  He finds his fatigue has improved.  He has no tremors or palpitations.    3.  History of Graves' disease.  No eye discomfort, redness or vision changes.    Pertinent endocrine and related history:  1.  Probable history of thyroid cancer.  Care Everywhere diagnosis summary indicates \"papillary microcarcinoma.\"  The patient himself does not recall a diagnosis of thyroid cancer.  He recalls having radioiodine scan for diagnosis of Graves' disease but does not recall radioiodine therapy after thyroidectomy, nor does he recall having neck ultrasounds.  -No history of excessive head or neck radiation exposure.  Mother has hypothyroidism, no family members with thyroid cancer.  2.  History of Graves' disease.  -He recalls that he initially presented with eye symptoms concerning for Graves' ophthalmopathy.  He was found to be hyperthyroid and after discussion of options he underwent thyroidectomy.  Surgery was performed in [place] (he was stationed in [facility] and the [employer] flew him to [place] for endocrine care).  -Record of operative note from thyroidectomy performed on 8/1/2012 indicates that patient had history of Graves' disease and preferred surgical treatment of Graves' " hyperthyroidism.  Total thyroidectomy was performed and based on review of operative note, it does not appear that there were unusual/remarkable findings.    Pertinent Social History: , no children; works in [employer].  Previously served in [employer].    PAST MEDICAL HISTORY  Past Medical History:   Diagnosis Date     Disease of thyroid gland      Hypertension        MEDICATIONS  Current Outpatient Medications   Medication Sig Dispense Refill     atorvastatin (LIPITOR) 40 MG tablet Take 1 tablet (40 mg) by mouth daily 90 tablet 4     levothyroxine (SYNTHROID/LEVOTHROID) 200 MCG tablet [LEVOTHYROXINE (SYNTHROID, LEVOTHROID) 200 MCG TABLET] Take 1 tablet by mouth once daily. 90 tablet 1     losartan (COZAAR) 50 MG tablet Take 1 tablet (50 mg) by mouth daily 90 tablet 4       Allergies, family, and social history were reviewed and documented as needed in EHR.     REVIEW OF SYSTEMS  A focused ROS was performed, with pertinent positives and negatives as noted in the HPI.    PHYSICAL EXAM  /86 (BP Location: Right arm, Patient Position: Sitting, Cuff Size: Adult Large)   Pulse 76   Wt 88.5 kg (195 lb)   BMI 33.47 kg/m    Body mass index is 33.47 kg/m .  Constitutional: Vital signs reviewed, as recorded above. Patient is alert, oriented and appears in no acute distress.  Eyes: PER, EOMI, mild proptosis but no stare, lid lag, or retraction; no conjunctival injection.  Neck: Neck supple, no palpable thyroid tissue, well-healed surgical incision site.  Lymphatic: No cervical or supraclavicular LAD.  Cardiovascular: RRR, normal S1/S2, no audible murmurs, rubs or gallops; no LE edema.  MSK: No clubbing or cyanosis; normal muscle bulk and tone.  Skin: Normal skin color, temperature, turgor and texture.  Neurological: Alert and oriented times 3. No tremor.    DATA REVIEW  Each of the following laboratory and/or imaging studies were reviewed.    Component      Latest Ref Rng 1/4/2023  3:42 PM   TSH      0.30 - 4.20  uIU/mL 0.72    See Scanned Result THYROGLOBULIN AND ANTIBODY (SENDOUT TO [facility])-Scanned      Component      Latest Ref Rng 5/8/2023  7:52 AM   TSH      0.30 - 4.20 uIU/mL 0.61    See Scanned Result            ASSESSMENT  1.  Hypothyroidism.  Postsurgical, with history of Graves' disease.  Thyroid function tests drawn prior to this visit remain in optimal range.  Continue levothyroxine without changes.  Check thyroid function tests in 6 months.  Follow-up in clinic in 1 year.    2.  Question of thyroid cancer.  Primary care notes have indicated prior records of thyroid cancer, although the patient does not recall a diagnosis of thyroid cancer, postsurgical radioiodine therapy or follow-up imaging studies after thyroidectomy.  We have requested records from the [facility] he was treated at but have not received them: His treatment was long enough ago that records may not be available.  Thyroglobulin panel checked after initial visit in 1/2023 shows very low thyroglobulin level with negative antibodies.  Discussed neck ultrasound to make sure there is no suspicious lymphadenopathy, however the patient notes that he has had significant medical costs recently and would like to defer this if at all possible.  We will therefore plan for neck ultrasound prior to visit in 1 year.    3.  Graves' disease.  No evidence of extrathyroidal manifestations.    PLAN  -Continue levothyroxine 200 mcg daily, separate from other medications and coffee/breakfast as already taking; please be mindful of any changes in levothyroxine pill color or shape (this might indicate a change in generic manufacture)  -Labs in 6 months  -Follow-up in one year, with labs and neck ultrasound before visit  -We will communicate results by phone     Orders Placed This Encounter   Procedures     US Head Neck Soft Tissue     TSH with free T4 reflex       I spent a total of 21 minutes on the date of encounter reviewing medical records, evaluating the  patient, coordinating care and documenting in the EHR, as detailed above.      Didier Nuñez MD   Division of Diabetes, Endocrinology and Metabolism  Department of Medicine

## 2023-05-24 ENCOUNTER — DOCUMENTATION ONLY (OUTPATIENT)
Dept: SLEEP MEDICINE | Facility: CLINIC | Age: 43
End: 2023-05-24
Payer: COMMERCIAL

## 2023-05-24 NOTE — PROGRESS NOTES
14  DAY STM VISIT    Diagnostic AHI:  18.4 HST    Message left for patient to return call     Assessment: Pt not meeting objective benchmarks for leak and compliance      Action plan: waiting for patient to return call.  and pt to have 30 day STM visit.      Device type: Auto-CPAP    PAP settings:  CPAP MIN CPAP MAX 95TH % PRESSURE EPR RESMED SOFT RESPONSE SETTING   5.0 cm  H20 15.0 cm  H20 12.4 cm  H20  TWO OFF     Mask type:  Nasal Mask    Objective measures: 14 day rolling measures   COMPLIANCE LEAK AHI AVERAGE USE IN MINUTES   42 % 67.38 6.05 234   GOAL >70% GOAL < 24 LPM GOAL <5 GOAL >240      Patient has the following upcoming sleep appts:      Total time spent on accessing and interpreting remote patient PAP therapy data  10 minutes    Total time spent counseling, coaching  and reviewing PAP therapy data with patient  1 minute    [id number]  [id number]  no (3 day STM)

## 2023-09-13 DIAGNOSIS — E89.0 POSTOPERATIVE HYPOTHYROIDISM: ICD-10-CM

## 2023-09-13 RX ORDER — LEVOTHYROXINE SODIUM 200 UG/1
TABLET ORAL
Qty: 90 TABLET | Refills: 0 | Status: SHIPPED | OUTPATIENT
Start: 2023-09-13 | End: 2024-01-23

## 2023-09-13 NOTE — TELEPHONE ENCOUNTER
"Requested Prescriptions   Pending Prescriptions Disp Refills    levothyroxine (SYNTHROID/LEVOTHROID) 200 MCG tablet 90 tablet 1     Sig: [LEVOTHYROXINE (SYNTHROID, LEVOTHROID) 200 MCG TABLET] Take 1 tablet by mouth once daily.       Thyroid Protocol Passed - 9/13/2023 10:58 AM        Passed - Patient is 12 years or older        Passed - Recent (12 mo) or future (30 days) visit within the authorizing provider's specialty     Patient has had an office visit with the authorizing provider or a provider within the authorizing providers department within the previous 12 mos or has a future within next 30 days. See \"Patient Info\" tab in inbasket, or \"Choose Columns\" in Meds & Orders section of the refill encounter.              Passed - Medication is active on med list        Passed - Normal TSH on file in past 12 months     Recent Labs   Lab Test 05/08/23  0752   TSH 0.61                   "

## 2023-11-13 ENCOUNTER — LAB (OUTPATIENT)
Dept: LAB | Facility: CLINIC | Age: 43
End: 2023-11-13
Payer: COMMERCIAL

## 2023-11-13 DIAGNOSIS — E89.0 POSTOPERATIVE HYPOTHYROIDISM: ICD-10-CM

## 2023-11-13 DIAGNOSIS — E05.00 GRAVES' DISEASE: ICD-10-CM

## 2023-11-13 PROCEDURE — 36415 COLL VENOUS BLD VENIPUNCTURE: CPT

## 2023-11-13 PROCEDURE — 84443 ASSAY THYROID STIM HORMONE: CPT

## 2023-11-14 LAB — TSH SERPL DL<=0.005 MIU/L-ACNC: 0.92 UIU/ML (ref 0.3–4.2)

## 2023-11-19 ENCOUNTER — MYC MEDICAL ADVICE (OUTPATIENT)
Dept: ENDOCRINOLOGY | Facility: CLINIC | Age: 43
End: 2023-11-19
Payer: COMMERCIAL

## 2023-11-19 ENCOUNTER — TELEPHONE (OUTPATIENT)
Dept: ENDOCRINOLOGY | Facility: CLINIC | Age: 43
End: 2023-11-19
Payer: COMMERCIAL

## 2023-11-19 DIAGNOSIS — C73 MALIGNANT NEOPLASM OF THYROID GLAND (H): ICD-10-CM

## 2023-11-19 DIAGNOSIS — E89.0 POSTOPERATIVE HYPOTHYROIDISM: Primary | ICD-10-CM

## 2023-11-19 NOTE — TELEPHONE ENCOUNTER
Please contact patient by phone with the following message (if he has not read [employer] message).  -Thyroid function test remains in ideal range: Continue levothyroxine without changes  -Please have lab draw and neck ultrasound prior to visit in May 2024

## 2023-11-21 NOTE — TELEPHONE ENCOUNTER
Spoke to pt- and informed of below Pt verbalized understanding. Pt asked the number to set up the Ultrasound be sent [facility].

## 2023-12-24 ENCOUNTER — HEALTH MAINTENANCE LETTER (OUTPATIENT)
Age: 43
End: 2023-12-24

## 2024-01-23 ENCOUNTER — MYC REFILL (OUTPATIENT)
Dept: FAMILY MEDICINE | Facility: CLINIC | Age: 44
End: 2024-01-23
Payer: COMMERCIAL

## 2024-01-23 ENCOUNTER — MYC REFILL (OUTPATIENT)
Dept: ENDOCRINOLOGY | Facility: CLINIC | Age: 44
End: 2024-01-23
Payer: COMMERCIAL

## 2024-01-23 DIAGNOSIS — E78.2 MIXED HYPERLIPIDEMIA: ICD-10-CM

## 2024-01-23 DIAGNOSIS — E89.0 POSTOPERATIVE HYPOTHYROIDISM: ICD-10-CM

## 2024-01-23 DIAGNOSIS — I10 ESSENTIAL HYPERTENSION: ICD-10-CM

## 2024-01-23 RX ORDER — ATORVASTATIN CALCIUM 40 MG/1
40 TABLET, FILM COATED ORAL DAILY
Qty: 90 TABLET | Refills: 4 | Status: SHIPPED | OUTPATIENT
Start: 2024-01-23

## 2024-01-23 RX ORDER — LEVOTHYROXINE SODIUM 200 UG/1
TABLET ORAL
Qty: 90 TABLET | Refills: 0 | Status: SHIPPED | OUTPATIENT
Start: 2024-01-23 | End: 2024-04-22

## 2024-01-23 RX ORDER — LOSARTAN POTASSIUM 50 MG/1
50 TABLET ORAL DAILY
Qty: 90 TABLET | Refills: 0 | Status: SHIPPED | OUTPATIENT
Start: 2024-01-23 | End: 2024-04-22

## 2024-01-23 NOTE — TELEPHONE ENCOUNTER
Requested Prescriptions   Pending Prescriptions Disp Refills    levothyroxine (SYNTHROID/LEVOTHROID) 200 MCG tablet 90 tablet 0     Sig: [LEVOTHYROXINE (SYNTHROID, LEVOTHROID) 200 MCG TABLET] Take 1 tablet by mouth once daily.       Thyroid Protocol Passed - 1/23/2024  5:54 AM        Passed - Patient is 12 years or older        Passed - Recent (12 mo) or future (30 days) visit within the authorizing provider's specialty     The patient must have completed an in-person or virtual visit within the past 12 months or has a future visit scheduled within the next 90 days with the authorizing provider s specialty.  Urgent care and e-visits do not quality as an office visit for this protocol.          Passed - Medication is active on med list        Passed - Normal TSH on file in past 12 months     Recent Labs   Lab Test 11/13/23  1601   TSH 0.92

## 2024-04-22 DIAGNOSIS — E89.0 POSTOPERATIVE HYPOTHYROIDISM: ICD-10-CM

## 2024-04-22 DIAGNOSIS — I10 ESSENTIAL HYPERTENSION: ICD-10-CM

## 2024-04-22 RX ORDER — LEVOTHYROXINE SODIUM 200 UG/1
TABLET ORAL
Qty: 30 TABLET | Refills: 0 | Status: SHIPPED | OUTPATIENT
Start: 2024-04-22 | End: 2024-05-08

## 2024-04-22 RX ORDER — LOSARTAN POTASSIUM 50 MG/1
50 TABLET ORAL DAILY
Qty: 30 TABLET | Refills: 0 | Status: SHIPPED | OUTPATIENT
Start: 2024-04-22 | End: 2024-06-28

## 2024-04-22 NOTE — TELEPHONE ENCOUNTER
Requested Prescriptions   Pending Prescriptions Disp Refills    levothyroxine (SYNTHROID/LEVOTHROID) 200 MCG tablet 90 tablet 0     Sig: [LEVOTHYROXINE (SYNTHROID, LEVOTHROID) 200 MCG TABLET] Take 1 tablet by mouth once daily.       Thyroid Protocol Passed - 4/22/2024  7:59 AM        Passed - Patient is 12 years or older        Passed - Recent (12 mo) or future (30 days) visit within the authorizing provider's specialty     The patient must have completed an in-person or virtual visit within the past 12 months or has a future visit scheduled within the next 90 days with the authorizing provider s specialty.  Urgent care and e-visits do not quality as an office visit for this protocol.          Passed - Medication is active on med list        Passed - Medication indicated for associated diagnosis     Medication is associated with one or more of the following diagnoses:  Hypothyroidism  Thyroid stimulating hormone suppression therapy  Thyroid cancer          Passed - Normal TSH on file in past 12 months     Recent Labs   Lab Test 11/13/23  1601   TSH 0.92

## 2024-04-29 ENCOUNTER — HOSPITAL ENCOUNTER (OUTPATIENT)
Dept: ULTRASOUND IMAGING | Facility: CLINIC | Age: 44
Discharge: HOME | End: 2024-04-29
Attending: INTERNAL MEDICINE | Admitting: INTERNAL MEDICINE
Payer: COMMERCIAL

## 2024-04-29 ENCOUNTER — LAB (OUTPATIENT)
Dept: LAB | Facility: CLINIC | Age: 44
End: 2024-04-29
Payer: COMMERCIAL

## 2024-04-29 DIAGNOSIS — C73 MALIGNANT NEOPLASM OF THYROID GLAND (H): ICD-10-CM

## 2024-04-29 DIAGNOSIS — E89.0 POSTOPERATIVE HYPOTHYROIDISM: ICD-10-CM

## 2024-04-29 DIAGNOSIS — E05.00 GRAVES' DISEASE: ICD-10-CM

## 2024-04-29 PROCEDURE — 84443 ASSAY THYROID STIM HORMONE: CPT

## 2024-04-29 PROCEDURE — 84432 ASSAY OF THYROGLOBULIN: CPT | Mod: 90

## 2024-04-29 PROCEDURE — 86800 THYROGLOBULIN ANTIBODY: CPT | Mod: 90

## 2024-04-29 PROCEDURE — 36415 COLL VENOUS BLD VENIPUNCTURE: CPT

## 2024-04-29 PROCEDURE — 99000 SPECIMEN HANDLING OFFICE-LAB: CPT

## 2024-04-29 PROCEDURE — 76536 US EXAM OF HEAD AND NECK: CPT

## 2024-04-30 LAB — TSH SERPL DL<=0.005 MIU/L-ACNC: 1.49 UIU/ML (ref 0.3–4.2)

## 2024-05-03 LAB — SCANNED LAB RESULT: NORMAL

## 2024-05-08 ENCOUNTER — OFFICE VISIT (OUTPATIENT)
Dept: ENDOCRINOLOGY | Facility: CLINIC | Age: 44
End: 2024-05-08
Payer: COMMERCIAL

## 2024-05-08 VITALS
HEART RATE: 92 BPM | SYSTOLIC BLOOD PRESSURE: 120 MMHG | WEIGHT: 199 LBS | DIASTOLIC BLOOD PRESSURE: 78 MMHG | BODY MASS INDEX: 34.16 KG/M2

## 2024-05-08 DIAGNOSIS — E89.0 POSTOPERATIVE HYPOTHYROIDISM: Primary | ICD-10-CM

## 2024-05-08 DIAGNOSIS — E05.00 GRAVES' DISEASE: ICD-10-CM

## 2024-05-08 DIAGNOSIS — C73 MALIGNANT NEOPLASM OF THYROID GLAND (H): ICD-10-CM

## 2024-05-08 PROCEDURE — 99214 OFFICE O/P EST MOD 30 MIN: CPT | Performed by: INTERNAL MEDICINE

## 2024-05-08 RX ORDER — LEVOTHYROXINE SODIUM 200 UG/1
TABLET ORAL
Qty: 90 TABLET | Refills: 3 | Status: SHIPPED | OUTPATIENT
Start: 2024-05-08

## 2024-05-08 NOTE — LETTER
"    5/8/2024         RE: Choco Vu  [address] [place] [address]        Dear Colleague,    Thank you for referring your patient, Choco Vu, to the [facility]. Please see a copy of my visit note below.      ENDOCRINOLOGY FOLLOW-UP        HISTORY OF PRESENT ILLNESS    Choco Vu is seen in follow-up.    1.  Possible history of thyroid cancer.    No change in the feel of his neck.  No lymphadenopathy.    No dysphagia, or stridor.  He has occasional fatigue of his voice if he talks for extended periods of time.    2.  Postsurgical hypothyroidism.  Continues on levothyroxine 200 mcg daily.   it from breakfast/coffee and other medications.  He has fair energy.  No tremors or palpitations.  No change in temperature tolerance.  Bowel movements are regular.    Pertinent endocrine and related history:  1.  Probable history of thyroid cancer.  Care Everywhere diagnosis summary indicates \"papillary microcarcinoma.\"  The patient himself does not recall a diagnosis of thyroid cancer.  He recalls having radioiodine scan for diagnosis of Graves' disease but does not recall radioiodine therapy after thyroidectomy, nor does he recall having neck ultrasounds.  -No history of excessive head or neck radiation exposure.  Mother has hypothyroidism, no family members with thyroid cancer.  -[facility].  -Thyroglobulin panel sent to the [facility] on 1/4/2023 showed a thyroglobulin level of 0.12 ng/mL, with antibody of less than 0.4 U/mL.  Concurrent TSH 0.72.  2.  History of Graves' disease.  -He recalls that he initially presented with eye symptoms concerning for Graves' ophthalmopathy.  He was found to be hyperthyroid and after discussion of options he underwent thyroidectomy.  Surgery was performed in [place] (he was stationed in [facility] and [employer] flew him to [place] for " endocrine care).  -Record of operative note from thyroidectomy performed on 8/1/2012 indicates that patient had history of Graves' disease and preferred surgical treatment of Graves' hyperthyroidism.  Total thyroidectomy was performed and based on review of operative note, it does not appear that there were unusual/remarkable findings.    Pertinent Social History: , no children; works in [employer].  Previously served in the [employer].    PAST MEDICAL HISTORY  Past Medical History:   Diagnosis Date     Disease of thyroid gland      Hypertension        MEDICATIONS  Current Outpatient Medications   Medication Sig Dispense Refill     atorvastatin (LIPITOR) 40 MG tablet Take 1 tablet (40 mg) by mouth daily 90 tablet 4     levothyroxine (SYNTHROID/LEVOTHROID) 200 MCG tablet [LEVOTHYROXINE (SYNTHROID, LEVOTHROID) 200 MCG TABLET] Take 1 tablet by mouth once daily. 90 tablet 3     losartan (COZAAR) 50 MG tablet Take 1 tablet (50 mg) by mouth daily 30 tablet 0       Allergies, family, and social history were reviewed and documented as needed in EHR.     REVIEW OF SYSTEMS  A focused ROS was performed, with pertinent positives and negatives as noted in the HPI.    PHYSICAL EXAM  /78 (BP Location: Right arm, Patient Position: Sitting, Cuff Size: Adult Large)   Pulse 92   Wt 90.3 kg (199 lb)   BMI 34.16 kg/m    Body mass index is 34.16 kg/m .  Constitutional: Vital signs reviewed, as recorded above. Patient is alert, oriented and appears in no acute distress.  Eyes: PER, EOMI, mild proptosis but no stare, lid lag, or retraction; no conjunctival injection.  Neck: Neck supple, no palpable thyroid tissue, well-healed surgical incision site.  Lymphatic: No cervical or supraclavicular LAD.  Cardiovascular: RRR, normal S1/S2, no audible murmurs, rubs or gallops; no LE edema.  Respiratory: Clear to auscultation bilaterally.  MSK: No clubbing or cyanosis; normal muscle bulk and tone.  Skin: Normal skin color, temperature,  turgor and texture.  Neurological: Alert and oriented times 3. No tremor.    DATA REVIEW  Each of the following laboratory and/or imaging studies were reviewed.                EXAM: US HEAD NECK SOFT TISSUE  LOCATION: [facility]  DATE: 4/29/2024     INDICATION: Possible history of thyroid cancer, please completed detailed neck ultrasound to assess for suspicious abnormal cervical lymph nodes. Thyroid cancer.  COMPARISON: None.  TECHNIQUE: Routine.     FINDINGS: There are postoperative changes of a bilateral thyroidectomy. No masses are seen within the thyroidectomy bed. Morphologically normal lymph nodes are seen in the neck bilaterally. No evidence for adenopathy.                                                                      IMPRESSION:  1.  Thyroidectomy.  2.  No cervical adenopathy.      ASSESSMENT  1.  Question of thyroid cancer.  Primary care notes have indicated prior records of thyroid cancer, although the patient does not recall a diagnosis of thyroid cancer, postsurgical radioiodine therapy or follow-up imaging studies after thyroidectomy.  We have requested records from the [facility] but have not received them (his treatment was long enough ago that records may not be available).  Thyroglobulin panel has been low, with undetectable thyroglobulin on labs drawn prior to this visit.  Neck ultrasound performed prior to this visit shows no neck masses or suspicious lymphadenopathy.  Given these findings, would recommend no additional intervention at present: We will plan thyroid hormone suppression with TSH less than 2.  We will consider reevaluation of neck ultrasound and thyroglobulin panel in 2 to 3 years, sooner if he notes any change in the feel of his neck or develops other concerning symptoms.    2.  Hypothyroidism.  Postsurgical, with history of Graves' disease.  Thyroid function tests drawn prior to this visit remain in optimal range.  Continue levothyroxine  without changes.    3.  Graves' disease.  No evidence of extrathyroidal manifestations.    PLAN  -Continue levothyroxine 200 mcg daily, separate from other medications and coffee/breakfast as already taking; please be mindful of any changes in levothyroxine pill color or shape (this might indicate a change in generic manufacture)  -Follow-up in one year, with labs before visit  -We will communicate results by phone     Orders Placed This Encounter   Procedures     TSH with free T4 reflex         Didier Nuñez MD   Division of Diabetes, Endocrinology and Metabolism  Department of Medicine           Again, thank you for allowing me to participate in the care of your patient.        Sincerely,        Didier Nuñez MD

## 2024-05-08 NOTE — PATIENT INSTRUCTIONS
-Continue levothyroxine 200 mcg daily, separate from other medications and coffee/breakfast as already taking; please be mindful of any changes in levothyroxine pill color or shape (this might indicate a change in generic manufacture)  -Follow-up in one year, with labs before visit  -We will communicate results by phone

## 2024-05-08 NOTE — PROGRESS NOTES
"  ENDOCRINOLOGY FOLLOW-UP        HISTORY OF PRESENT ILLNESS    Choco Vu is seen in follow-up.    1.  Possible history of thyroid cancer.    No change in the feel of his neck.  No lymphadenopathy.    No dysphagia, or stridor.  He has occasional fatigue of his voice if he talks for extended periods of time.    2.  Postsurgical hypothyroidism.  Continues on levothyroxine 200 mcg daily.   it from breakfast/coffee and other medications.  He has fair energy.  No tremors or palpitations.  No change in temperature tolerance.  Bowel movements are regular.    Pertinent endocrine and related history:  1.  Probable history of thyroid cancer.  Care Everywhere diagnosis summary indicates \"papillary microcarcinoma.\"  The patient himself does not recall a diagnosis of thyroid cancer.  He recalls having radioiodine scan for diagnosis of Graves' disease but does not recall radioiodine therapy after thyroidectomy, nor does he recall having neck ultrasounds.  -No history of excessive head or neck radiation exposure.  Mother has hypothyroidism, no family members with thyroid cancer.  -We were [facility].  -Thyroglobulin panel sent to [facility] on 1/4/2023 showed a thyroglobulin level of 0.12 ng/mL, with antibody of less than 0.4 U/mL.  Concurrent TSH 0.72.  2.  History of Graves' disease.  -He recalls that he initially presented with eye symptoms concerning for Graves' ophthalmopathy.  He was found to be hyperthyroid and after discussion of options he underwent thyroidectomy.  Surgery was performed in [place] (he was stationed in [facility] and the [employer] flew him to [place] for endocrine care).  -Record of operative note from thyroidectomy performed on 8/1/2012 indicates that patient had history of Graves' disease and preferred surgical treatment of Graves' hyperthyroidism.  Total thyroidectomy was performed and based on review of " operative note, it does not appear that there were unusual/remarkable findings.    Pertinent Social History: , no children; works in [employer].  Previously served in the [employer].    PAST MEDICAL HISTORY  Past Medical History:   Diagnosis Date    Disease of thyroid gland     Hypertension        MEDICATIONS  Current Outpatient Medications   Medication Sig Dispense Refill    atorvastatin (LIPITOR) 40 MG tablet Take 1 tablet (40 mg) by mouth daily 90 tablet 4    levothyroxine (SYNTHROID/LEVOTHROID) 200 MCG tablet [LEVOTHYROXINE (SYNTHROID, LEVOTHROID) 200 MCG TABLET] Take 1 tablet by mouth once daily. 90 tablet 3    losartan (COZAAR) 50 MG tablet Take 1 tablet (50 mg) by mouth daily 30 tablet 0       Allergies, family, and social history were reviewed and documented as needed in EHR.     REVIEW OF SYSTEMS  A focused ROS was performed, with pertinent positives and negatives as noted in the HPI.    PHYSICAL EXAM  /78 (BP Location: Right arm, Patient Position: Sitting, Cuff Size: Adult Large)   Pulse 92   Wt 90.3 kg (199 lb)   BMI 34.16 kg/m    Body mass index is 34.16 kg/m .  Constitutional: Vital signs reviewed, as recorded above. Patient is alert, oriented and appears in no acute distress.  Eyes: PER, EOMI, mild proptosis but no stare, lid lag, or retraction; no conjunctival injection.  Neck: Neck supple, no palpable thyroid tissue, well-healed surgical incision site.  Lymphatic: No cervical or supraclavicular LAD.  Cardiovascular: RRR, normal S1/S2, no audible murmurs, rubs or gallops; no LE edema.  Respiratory: Clear to auscultation bilaterally.  MSK: No clubbing or cyanosis; normal muscle bulk and tone.  Skin: Normal skin color, temperature, turgor and texture.  Neurological: Alert and oriented times 3. No tremor.    DATA REVIEW  Each of the following laboratory and/or imaging studies were reviewed.                EXAM: US HEAD NECK SOFT TISSUE  LOCATION: [facility]  DATE:  4/29/2024     INDICATION: Possible history of thyroid cancer, please completed detailed neck ultrasound to assess for suspicious abnormal cervical lymph nodes. Thyroid cancer.  COMPARISON: None.  TECHNIQUE: Routine.     FINDINGS: There are postoperative changes of a bilateral thyroidectomy. No masses are seen within the thyroidectomy bed. Morphologically normal lymph nodes are seen in the neck bilaterally. No evidence for adenopathy.                                                                      IMPRESSION:  1.  Thyroidectomy.  2.  No cervical adenopathy.      ASSESSMENT  1.  Question of thyroid cancer.  Primary care notes have indicated prior records of thyroid cancer, although the patient does not recall a diagnosis of thyroid cancer, postsurgical radioiodine therapy or follow-up imaging studies after thyroidectomy.  We have requested records from the [facility] but have not received them (his treatment was long enough ago that records may not be available).  Thyroglobulin panel has been low, with undetectable thyroglobulin on labs drawn prior to this visit.  Neck ultrasound performed prior to this visit shows no neck masses or suspicious lymphadenopathy.  Given these findings, would recommend no additional intervention at present: We will plan thyroid hormone suppression with TSH less than 2.  We will consider reevaluation of neck ultrasound and thyroglobulin panel in 2 to 3 years, sooner if he notes any change in the feel of his neck or develops other concerning symptoms.    2.  Hypothyroidism.  Postsurgical, with history of Graves' disease.  Thyroid function tests drawn prior to this visit remain in optimal range.  Continue levothyroxine without changes.    3.  Graves' disease.  No evidence of extrathyroidal manifestations.    PLAN  -Continue levothyroxine 200 mcg daily, separate from other medications and coffee/breakfast as already taking; please be mindful of any changes in levothyroxine pill  color or shape (this might indicate a change in generic manufacture)  -Follow-up in one year, with labs before visit  -We will communicate results by phone     Orders Placed This Encounter   Procedures    TSH with free T4 reflex         Didier Nuñez MD   Division of Diabetes, Endocrinology and Metabolism  Department of Medicine

## 2024-06-28 DIAGNOSIS — I10 ESSENTIAL HYPERTENSION: ICD-10-CM

## 2024-06-28 RX ORDER — LOSARTAN POTASSIUM 50 MG/1
50 TABLET ORAL DAILY
Qty: 30 TABLET | Refills: 0 | Status: SHIPPED | OUTPATIENT
Start: 2024-06-28 | End: 2024-08-05

## 2024-06-28 NOTE — TELEPHONE ENCOUNTER
Pending Prescriptions:                       Disp   Refills    losartan (COZAAR) 50 MG tablet            30 tab*0            Sig: Take 1 tablet (50 mg) by mouth daily    Pharmacy: [facility] - [place], [place] - [address] [place] [address].

## 2024-08-02 DIAGNOSIS — I10 ESSENTIAL HYPERTENSION: ICD-10-CM

## 2024-08-05 ENCOUNTER — MYC MEDICAL ADVICE (OUTPATIENT)
Dept: FAMILY MEDICINE | Facility: CLINIC | Age: 44
End: 2024-08-05
Payer: COMMERCIAL

## 2024-08-05 RX ORDER — LOSARTAN POTASSIUM 50 MG/1
50 TABLET ORAL DAILY
Qty: 30 TABLET | Refills: 0 | Status: SHIPPED | OUTPATIENT
Start: 2024-08-05

## 2024-08-30 NOTE — TELEPHONE ENCOUNTER
Monitor: The patient's diabetes is unchanged.  Evaluation: Diagnostic tests ordered, see full progress note.  Reviewed recent labs/diagnostic tests with the patient.  Assessment/Treatment:  Continue current treatment/monitoring regimen.  Continue current treatment regimen.  Dietary recommendations for ADA diet.  Discussed foot care.  Reminded to get yearly retinal exam.  Condition will be reassessed per progress note   Physical appt scheduled.

## 2025-01-18 ENCOUNTER — HEALTH MAINTENANCE LETTER (OUTPATIENT)
Age: 45
End: 2025-01-18

## 2025-02-10 ENCOUNTER — OFFICE VISIT (OUTPATIENT)
Dept: FAMILY MEDICINE | Facility: CLINIC | Age: 45
End: 2025-02-10
Payer: COMMERCIAL

## 2025-02-10 VITALS
SYSTOLIC BLOOD PRESSURE: 140 MMHG | WEIGHT: 206.5 LBS | OXYGEN SATURATION: 97 % | HEART RATE: 81 BPM | TEMPERATURE: 97.9 F | BODY MASS INDEX: 34.41 KG/M2 | RESPIRATION RATE: 16 BRPM | HEIGHT: 65 IN | DIASTOLIC BLOOD PRESSURE: 90 MMHG

## 2025-02-10 DIAGNOSIS — Z00.00 ROUTINE GENERAL MEDICAL EXAMINATION AT A HEALTH CARE FACILITY: ICD-10-CM

## 2025-02-10 DIAGNOSIS — E05.00 GRAVES DISEASE: ICD-10-CM

## 2025-02-10 DIAGNOSIS — I10 ESSENTIAL HYPERTENSION: ICD-10-CM

## 2025-02-10 DIAGNOSIS — L72.0 EPIDERMAL CYST OF NECK: ICD-10-CM

## 2025-02-10 DIAGNOSIS — E78.2 MIXED HYPERLIPIDEMIA: Primary | ICD-10-CM

## 2025-02-10 LAB
ANION GAP SERPL CALCULATED.3IONS-SCNC: 13 MMOL/L (ref 7–15)
BUN SERPL-MCNC: 13.2 MG/DL (ref 6–20)
CALCIUM SERPL-MCNC: 10.1 MG/DL (ref 8.8–10.4)
CHLORIDE SERPL-SCNC: 101 MMOL/L (ref 98–107)
CHOLEST SERPL-MCNC: 167 MG/DL
CREAT SERPL-MCNC: 0.94 MG/DL (ref 0.67–1.17)
EGFRCR SERPLBLD CKD-EPI 2021: >90 ML/MIN/1.73M2
FASTING STATUS PATIENT QL REPORTED: NO
FASTING STATUS PATIENT QL REPORTED: NO
GLUCOSE SERPL-MCNC: 92 MG/DL (ref 70–99)
HCO3 SERPL-SCNC: 24 MMOL/L (ref 22–29)
HDLC SERPL-MCNC: 44 MG/DL
LDLC SERPL CALC-MCNC: 102 MG/DL
NONHDLC SERPL-MCNC: 123 MG/DL
POTASSIUM SERPL-SCNC: 4.1 MMOL/L (ref 3.4–5.3)
SODIUM SERPL-SCNC: 138 MMOL/L (ref 135–145)
TRIGL SERPL-MCNC: 106 MG/DL
TSH SERPL DL<=0.005 MIU/L-ACNC: 1.17 UIU/ML (ref 0.3–4.2)

## 2025-02-10 PROCEDURE — 11420 EXC H-F-NK-SP B9+MARG 0.5/<: CPT

## 2025-02-10 PROCEDURE — 80048 BASIC METABOLIC PNL TOTAL CA: CPT

## 2025-02-10 PROCEDURE — 36415 COLL VENOUS BLD VENIPUNCTURE: CPT

## 2025-02-10 PROCEDURE — 80061 LIPID PANEL: CPT

## 2025-02-10 PROCEDURE — 99214 OFFICE O/P EST MOD 30 MIN: CPT | Mod: 25

## 2025-02-10 PROCEDURE — 99396 PREV VISIT EST AGE 40-64: CPT | Mod: 25

## 2025-02-10 PROCEDURE — 84443 ASSAY THYROID STIM HORMONE: CPT

## 2025-02-10 RX ORDER — LOSARTAN POTASSIUM 50 MG/1
50 TABLET ORAL DAILY
Qty: 30 TABLET | Refills: 0 | Status: SHIPPED | OUTPATIENT
Start: 2025-02-10

## 2025-02-10 SDOH — HEALTH STABILITY: PHYSICAL HEALTH: ON AVERAGE, HOW MANY DAYS PER WEEK DO YOU ENGAGE IN MODERATE TO STRENUOUS EXERCISE (LIKE A BRISK WALK)?: 1 DAY

## 2025-02-10 ASSESSMENT — SOCIAL DETERMINANTS OF HEALTH (SDOH): HOW OFTEN DO YOU GET TOGETHER WITH FRIENDS OR RELATIVES?: PATIENT DECLINED

## 2025-02-10 NOTE — PROGRESS NOTES
"Preventive Care Visit  [facility]  Nilay Winchester DO, Family Medicine  Feb 10, 2025      Assessment & Plan       Routine general medical examination at a health care facility  Essential hypertension  Pleasant 44-year-old male here for physical exam with acute and chronic complaints below  Doing well  Lives with wife  Continues to drive truck  Not due for colonoscopy      - losartan (COZAAR) 50 MG tablet; Take 1 tablet (50 mg) by mouth daily.  - Surgical Pathology Exam  - BASIC METABOLIC PANEL  - REVIEW OF HEALTH MAINTENANCE PROTOCOL ORDERS    Mixed hyperlipidemia  Currently on atorvastatin  We will repeat and calculate ASCVD  We will send patient a [employer] message with results  - Lipid panel reflex to direct LDL Non-fasting    Epidermal cyst of neck  Has been on the posterior base of the neck on the left for approximately 1 year  It is discomfort and painful when it is caught on his chain necklace around his collar  He is also uncomfortable when he is driving  See procedure note below  I suspect this to be a cystic lesion due to the lack of erythema, drainage, hardness  We will send patient a [employer] message with results  - scalp, neck, hands, feet, genitalia, other    Graves disease  Follows endocrinology  Will check TSH  - TSH with free T4 reflex    Patient has been advised of split billing requirements and indicates understanding: Yes        BMI  Estimated body mass index is 34.61 kg/m  as calculated from the following:    Height as of this encounter: 1.645 m (5' 4.76\").    Weight as of this encounter: 93.7 kg (206 lb 8 oz).     Counseling  Appropriate preventive services were addressed with this patient via screening, questionnaire, or discussion as appropriate for fall prevention, nutrition, physical activity, Tobacco-use cessation, social engagement, weight loss and cognition.  Checklist reviewing preventive services available has been given to the patient.  Reviewed patient's diet, " addressing concerns and/or questions.   He is at risk for lack of exercise and has been provided with information to increase physical activity for the benefit of his well-being.   The patient was instructed to see the dentist every 6 months.       Bill Wilhelm is a 44 year old, presenting for the following:  Physical (Med refills. 3 months without losartan )        2/10/2025     4:15 PM   Additional Questions   Roomed by Nikki SALAMANCA          HPI  Has been out of his HTN medication for a couple months  Was diagnosed in 2015  Has occasional headaches but unsure about if its when his BP is high  Feels like his hands are swollen - this is the symptom he had when he was diagnosed with HTN    Has had a lump on the back of his neck for about one year  Will bleed if he picks at it  No drainage of puss - sometimes clear liquid  Bothers him when he is driving or on the collar    GENERAL: No fever, chills, weight loss or gain  HEENT: Occasional headaches  CARDIAC: Denies chest pain or shortness of breath  LUNG: Denies dyspnea on exertion or chest pain  ABD: Denies pain, nausea, vomiting, diarrhea, constipation  : No changes in bladder habits  SKIN: See above  NEURO: No numbness, weakness, tingling   MSK: Hand swelling  PSYCH: No changes in mood, no HI or SI        Health Care Directive  Patient does not have a Health Care Directive: Discussed advance care planning with patient; however, patient declined at this time.      2/10/2025   General Health   How would you rate your overall physical health? Good   Feel stress (tense, anxious, or unable to sleep) Only a little   (!) STRESS CONCERN      2/10/2025   Nutrition   Three or more servings of calcium each day? (!) NO   Diet: Regular (no restrictions)   How many servings of fruit and vegetables per day? (!) 2-3   How many sweetened beverages each day? 0-1         2/10/2025   Exercise   Days per week of moderate/strenous exercise 1 day   (!) EXERCISE CONCERN      2/10/2025    Social Factors   Frequency of gathering with friends or relatives Patient declined   Worry food won't last until get money to buy more No   Food not last or not have enough money for food? No   Do you have housing? (Housing is defined as stable permanent housing and does not include staying ouside in a car, in a tent, in an abandoned building, in an overnight shelter, or couch-surfing.) Yes   Are you worried about losing your housing? No   Lack of transportation? No   Unable to get utilities (heat,electricity)? No         2/10/2025   Dental   Dentist two times every year? (!) NO            Today's PHQ-2 Score:       2/10/2025     3:48 PM   PHQ-2 ( 1999 [employer])   Q1: Little interest or pleasure in doing things 0   Q2: Feeling down, depressed or hopeless 0   PHQ-2 Score 0    Q1: Little interest or pleasure in doing things Not at all   Q2: Feeling down, depressed or hopeless Not at all   PHQ-2 Score 0       Patient-reported           2/10/2025   Substance Use   Alcohol more than 3/day or more than 7/wk No   Do you use any other substances recreationally? No     Social History     Tobacco Use    Smoking status: Former     Current packs/day: 1.00     Types: Cigarettes    Smokeless tobacco: Former   Vaping Use    Vaping status: Some Days    Substances: Nicotine    Devices: Disposable   Substance Use Topics    Drug use: No           2/10/2025   STI Screening   New sexual partner(s) since last STI/HIV test? No   ASCVD Risk   The 10-year ASCVD risk score (Hector PELAEZ, et al., 2019) is: 2.6%    Values used to calculate the score:      Age: 44 years      Sex: Male      Is Non- : No      Diabetic: No      Tobacco smoker: No      Systolic Blood Pressure: 140 mmHg      Is BP treated: Yes      HDL Cholesterol: 37 mg/dL      Total Cholesterol: 165 mg/dL        2/10/2025   Contraception/Family Planning   Questions about contraception or family planning No        Reviewed and updated as needed this  "visit by Provider                       Objective    Exam  BP (!) 140/90   Pulse 81   Temp 97.9  F (36.6  C) (Oral)   Resp 16   Ht 1.645 m (5' 4.76\")   Wt 93.7 kg (206 lb 8 oz)   SpO2 97%   BMI 34.61 kg/m     Estimated body mass index is 34.61 kg/m  as calculated from the following:    Height as of this encounter: 1.645 m (5' 4.76\").    Weight as of this encounter: 93.7 kg (206 lb 8 oz).    Physical Exam  Vitals reviewed.   Constitutional:       Appearance: Normal appearance.   HENT:      Head: Normocephalic and atraumatic.      Right Ear: Tympanic membrane, ear canal and external ear normal.      Left Ear: Tympanic membrane, ear canal and external ear normal.      Nose: Nose normal.      Mouth/Throat:      Mouth: Mucous membranes are moist.   Eyes:      Extraocular Movements: Extraocular movements intact.      Pupils: Pupils are equal, round, and reactive to light.   Cardiovascular:      Rate and Rhythm: Normal rate and regular rhythm.      Heart sounds: Normal heart sounds.   Pulmonary:      Effort: Pulmonary effort is normal.      Breath sounds: Normal breath sounds.   Abdominal:      General: Abdomen is flat. Bowel sounds are normal.      Palpations: Abdomen is soft.   Musculoskeletal:      Cervical back: Normal range of motion and neck supple.      Comments: Appropriate strength in tested fields   Skin:     General: Skin is warm and dry.      Comments: 4 cm x 5 cm raised lesion  Nontender  Nondraining   Hard to palpation   Neurological:      General: No focal deficit present.      Mental Status: He is alert.   Psychiatric:         Mood and Affect: Mood normal.         Behavior: Behavior normal.       After explaining procedure of excisional biopsy including risk of bleeding and infection and benefits of lesion removal and tissue diagnosis, the patient agreed to proceed with the procedure.  Written consent was obtained.  Area was anesthetized with 1% lidocaine with epinephrine, a total of 4 cc. Lesions " cleaned with betadine x 3. Lesion grasped with a tissue forceps and and ellipse of tissue is removed with a # 1 11 blade. Pressure applied for hemostasis.  3 interrupted sutures placed with 4-0 Prolene suture. Area dressed with bandaid. Wound care given to the patient. Pt tolerated procedure well, ebl minimal, no complications. Pt will return in 7 days for suture removal. Specimen x 2 sent for pathology .  We will send patient a [employer] message with results    Signed Electronically by: Nilay Winchester DO

## 2025-02-10 NOTE — PATIENT INSTRUCTIONS
Thank you for seeing us at  [facility].     To Review:  If you are getting lab work today, we will send you a [employer] message with recommendations once these are all returned to us.  X-rays are able to be performed in clinic and we will notify you of the results.  Any other imaging is scheduled and you will be contacted by the scheduling department.  If you do not hear from them in 2 weeks, please call [phone]   If you are getting immunizations today, you may have some arm soreness; you can use tylenol or ibuprofen for this.    An E visit is an excellent way to get quick evaluation from myself. These can be completed using the [employer] or online using [employer]. We can evaluate a variety of conditions using this including sinusitis, skin conditions, etc. Please send us a [employer] Message or call if having issues or questions.    Nilay Winchester DO, MS  [facility]  [phone]      Patient Education   Preventive Care Advice   This is general advice given by our system to help you stay healthy. However, your care team may have specific advice just for you. Please talk to your care team about your preventive care needs.  Nutrition  Eat 5 or more servings of fruits and vegetables each day.  Try wheat bread, brown rice and whole grain pasta (instead of white bread, rice, and pasta).  Get enough calcium and vitamin D. Check the label on foods and aim for 100% of the RDA (recommended daily allowance).  Lifestyle  Exercise at least 150 minutes each week  (30 minutes a day, 5 days a week).  Do muscle strengthening activities 2 days a week. These help control your weight and prevent disease.  No smoking.  Wear sunscreen to prevent skin cancer.  Have a dental exam and cleaning every 6 months.  Yearly exams  See your health care team every year to talk about:  Any changes in your health.  Any medicines your care team has prescribed.  Preventive care, family planning, and  ways to prevent chronic diseases.  Shots (vaccines)   HPV shots (up to age 26), if you've never had them before.  Hepatitis B shots (up to age 59), if you've never had them before.  COVID-19 shot: Get this shot when it's due.  Flu shot: Get a flu shot every year.  Tetanus shot: Get a tetanus shot every 10 years.  Pneumococcal, hepatitis A, and RSV shots: Ask your care team if you need these based on your risk.  Shingles shot (for age 50 and up)  General health tests  Diabetes screening:  Starting at age 35, Get screened for diabetes at least every 3 years.  If you are younger than age 35, ask your care team if you should be screened for diabetes.  Cholesterol test: At age 39, start having a cholesterol test every 5 years, or more often if advised.  Bone density scan (DEXA): At age 50, ask your care team if you should have this scan for osteoporosis (brittle bones).  Hepatitis C: Get tested at least once in your life.  STIs (sexually transmitted infections)  Before age 24: Ask your care team if you should be screened for STIs.  After age 24: Get screened for STIs if you're at risk. You are at risk for STIs (including HIV) if:  You are sexually active with more than one person.  You don't use condoms every time.  You or a partner was diagnosed with a sexually transmitted infection.  If you are at risk for HIV, ask about PrEP medicine to prevent HIV.  Get tested for HIV at least once in your life, whether you are at risk for HIV or not.  Cancer screening tests  Cervical cancer screening: If you have a cervix, begin getting regular cervical cancer screening tests starting at age 21.  Breast cancer scan (mammogram): If you've ever had breasts, begin having regular mammograms starting at age 40. This is a scan to check for breast cancer.  Colon cancer screening: It is important to start screening for colon cancer at age 45.  Have a colonoscopy test every 10 years (or more often if you're at risk) Or, ask your provider  about stool tests like a FIT test every year or Cologuard test every 3 years.  To learn more about your testing options, visit:   .  For help making a decision, visit:   https://bit.ly/yv00156.  Prostate cancer screening test: If you have a prostate, ask your care team if a prostate cancer screening test (PSA) at age 55 is right for you.  Lung cancer screening: If you are a current or former smoker ages 50 to 80, ask your care team if ongoing lung cancer screenings are right for you.  For informational purposes only. Not to replace the advice of your health care provider. Copyright   2023 [facility]. All rights reserved. Clinically reviewed by the [facility] Program. Re-Compose 422682 - REV 01/24.

## 2025-02-18 ENCOUNTER — ALLIED HEALTH/NURSE VISIT (OUTPATIENT)
Dept: FAMILY MEDICINE | Facility: CLINIC | Age: 45
End: 2025-02-18
Payer: COMMERCIAL

## 2025-02-18 DIAGNOSIS — Z48.02 VISIT FOR SUTURE REMOVAL: Primary | ICD-10-CM

## 2025-02-18 PROCEDURE — 99207 PR NO CHARGE NURSE ONLY: CPT

## 2025-02-18 NOTE — PROGRESS NOTES
Choco Vu presents to the clinic today for removal of sutures.  The patient has had the sutures in place for 8 days.  There has been no history of infection or drainage.  3 sutures are seen located on the Back of the neck.  The wound is healing well with no signs of infection.  Tetanus status is up to date.   All sutures and suture were easily removed today.  Routine wound care discussed.  The patient will follow up as needed.     SHARONDA Zavala BSN  [facility]  [phone]

## 2025-02-26 LAB
PATH REPORT.COMMENTS IMP SPEC: NORMAL
PATH REPORT.FINAL DX SPEC: NORMAL
PATH REPORT.GROSS SPEC: NORMAL
PATH REPORT.MICROSCOPIC SPEC OTHER STN: NORMAL
PATH REPORT.MICROSCOPIC SPEC OTHER STN: NORMAL
PATH REPORT.RELEVANT HX SPEC: NORMAL
PHOTO IMAGE: NORMAL

## 2025-02-27 ENCOUNTER — LAB REQUISITION (OUTPATIENT)
Dept: LAB | Facility: CLINIC | Age: 45
End: 2025-02-27
Payer: COMMERCIAL

## 2025-02-27 PROCEDURE — 88323 CONSLTJ&REPRT MATRL PREP SLD: CPT | Mod: TC | Performed by: PATHOLOGY

## 2025-03-03 LAB
PATH REPORT.COMMENTS IMP SPEC: NORMAL
PATH REPORT.FINAL DX SPEC: NORMAL
PATH REPORT.GROSS SPEC: NORMAL
PATH REPORT.MICROSCOPIC SPEC OTHER STN: NORMAL
PATH REPORT.RELEVANT HX SPEC: NORMAL
PATH REPORT.RELEVANT HX SPEC: NORMAL
PATH REPORT.SITE OF ORIGIN SPEC: NORMAL

## 2025-03-06 ENCOUNTER — TELEPHONE (OUTPATIENT)
Dept: FAMILY MEDICINE | Facility: CLINIC | Age: 45
End: 2025-03-06
Payer: COMMERCIAL

## 2025-03-06 DIAGNOSIS — L72.0 EPIDERMAL CYST OF NECK: Primary | ICD-10-CM

## 2025-03-06 DIAGNOSIS — D23.9 HIDRADENOMA: ICD-10-CM

## 2025-03-19 LAB
PATH REPORT.ADDENDUM SPEC: NORMAL
PATH REPORT.COMMENTS IMP SPEC: NORMAL
PATH REPORT.FINAL DX SPEC: NORMAL
PATH REPORT.GROSS SPEC: NORMAL
PATH REPORT.MICROSCOPIC SPEC OTHER STN: NORMAL
PATH REPORT.MICROSCOPIC SPEC OTHER STN: NORMAL
PATH REPORT.RELEVANT HX SPEC: NORMAL
PHOTO IMAGE: NORMAL

## 2025-03-20 ENCOUNTER — MYC REFILL (OUTPATIENT)
Dept: FAMILY MEDICINE | Facility: CLINIC | Age: 45
End: 2025-03-20
Payer: COMMERCIAL

## 2025-03-20 DIAGNOSIS — I10 ESSENTIAL HYPERTENSION: ICD-10-CM

## 2025-03-20 RX ORDER — LOSARTAN POTASSIUM 50 MG/1
50 TABLET ORAL DAILY
Qty: 30 TABLET | Refills: 0 | Status: SHIPPED | OUTPATIENT
Start: 2025-03-20

## 2025-05-06 ENCOUNTER — LAB (OUTPATIENT)
Dept: LAB | Facility: CLINIC | Age: 45
End: 2025-05-06
Payer: COMMERCIAL

## 2025-05-06 DIAGNOSIS — E89.0 POSTOPERATIVE HYPOTHYROIDISM: ICD-10-CM

## 2025-05-06 LAB — TSH SERPL DL<=0.005 MIU/L-ACNC: 0.48 UIU/ML (ref 0.3–4.2)

## 2025-05-06 PROCEDURE — 84443 ASSAY THYROID STIM HORMONE: CPT

## 2025-05-06 PROCEDURE — 36415 COLL VENOUS BLD VENIPUNCTURE: CPT

## 2025-05-09 ENCOUNTER — RESULTS FOLLOW-UP (OUTPATIENT)
Dept: ENDOCRINOLOGY | Facility: CLINIC | Age: 45
End: 2025-05-09

## 2025-07-02 ENCOUNTER — DOCUMENTATION ONLY (OUTPATIENT)
Dept: SLEEP MEDICINE | Facility: CLINIC | Age: 45
End: 2025-07-02
Payer: COMMERCIAL

## 2025-07-02 DIAGNOSIS — G47.33 OBSTRUCTIVE SLEEP APNEA: Primary | ICD-10-CM

## 2025-07-25 ENCOUNTER — MYC REFILL (OUTPATIENT)
Dept: ENDOCRINOLOGY | Facility: CLINIC | Age: 45
End: 2025-07-25
Payer: COMMERCIAL

## 2025-07-25 DIAGNOSIS — E89.0 POSTOPERATIVE HYPOTHYROIDISM: ICD-10-CM

## 2025-07-28 RX ORDER — LEVOTHYROXINE SODIUM 200 UG/1
TABLET ORAL
Qty: 90 TABLET | Refills: 2 | Status: SHIPPED | OUTPATIENT
Start: 2025-07-28

## 2025-07-28 NOTE — TELEPHONE ENCOUNTER
RX resent.    Requested Prescriptions   Pending Prescriptions Disp Refills    levothyroxine (SYNTHROID/LEVOTHROID) 200 MCG tablet 90 tablet 3     Sig: [LEVOTHYROXINE (SYNTHROID, LEVOTHROID) 200 MCG TABLET] Take 1 tablet by mouth once daily.       Thyroid Protocol Passed - 7/28/2025  1:11 PM        Passed - Patient is 12 years or older        Passed - Medication is active on med list and the sig matches. RN to manually verify dose and sig if red X/fail.     If the protocol passes (green check), you do not need to verify med dose and sig.    A prescription matches if they are the same clinical intention.    For Example: once daily and every morning are the same.    The protocol can not identify upper and lower case letters as matching and will fail.     For Example: Take 1 tablet (50 mg) by mouth daily     TAKE 1 TABLET (50 MG) BY MOUTH DAILY    For all fails (red x), verify dose and sig.    If the refill does match what is on file, the RN can still proceed to approve the refill request.       If they do not match, route to the appropriate provider.             Passed - Recent (12 month) or future (90 days) visit with authorizing provider's specialty (provided they have been seen in the past 15 months)     The patient must have completed an in-person or virtual visit within the past 12 months or has a future visit scheduled within the next 90 days with the authorizing provider s specialty.  Urgent care and e-visits do not qualify as an office visit for this protocol.          Passed - Medication indicated for associated diagnosis     Medication is associated with one or more of the following diagnoses:  Hypothyroidism  Thyroid stimulating hormone suppression therapy  Thyroid cancer  Acquired atrophy of thyroid          Passed - Normal TSH on file in past 12 months     Recent Labs   Lab Test 05/06/25  1103   TSH 0.48